# Patient Record
Sex: FEMALE | Race: BLACK OR AFRICAN AMERICAN | NOT HISPANIC OR LATINO
[De-identification: names, ages, dates, MRNs, and addresses within clinical notes are randomized per-mention and may not be internally consistent; named-entity substitution may affect disease eponyms.]

---

## 2017-04-18 ENCOUNTER — RESULT REVIEW (OUTPATIENT)
Age: 32
End: 2017-04-18

## 2017-04-19 ENCOUNTER — OUTPATIENT (OUTPATIENT)
Dept: OUTPATIENT SERVICES | Facility: HOSPITAL | Age: 32
LOS: 1 days | End: 2017-04-19
Payer: MEDICAID

## 2017-04-19 ENCOUNTER — APPOINTMENT (OUTPATIENT)
Dept: OBGYN | Facility: CLINIC | Age: 32
End: 2017-04-19

## 2017-04-19 VITALS
SYSTOLIC BLOOD PRESSURE: 128 MMHG | WEIGHT: 154 LBS | DIASTOLIC BLOOD PRESSURE: 79 MMHG | HEIGHT: 67 IN | BODY MASS INDEX: 24.17 KG/M2

## 2017-04-19 DIAGNOSIS — Z34.00 ENCOUNTER FOR SUPERVISION OF NORMAL FIRST PREGNANCY, UNSPECIFIED TRIMESTER: ICD-10-CM

## 2017-04-19 DIAGNOSIS — Z86.2 PERSONAL HISTORY OF DISEASES OF THE BLOOD AND BLOOD-FORMING ORGANS AND CERTAIN DISORDERS INVOLVING THE IMMUNE MECHANISM: ICD-10-CM

## 2017-04-19 LAB
APPEARANCE UR: CLEAR — SIGNIFICANT CHANGE UP
BASOPHILS # BLD AUTO: 0.02 K/UL — SIGNIFICANT CHANGE UP (ref 0–0.2)
BASOPHILS NFR BLD AUTO: 0.2 % — SIGNIFICANT CHANGE UP (ref 0–2)
BILIRUB UR-MCNC: NEGATIVE — SIGNIFICANT CHANGE UP
COLOR SPEC: YELLOW — SIGNIFICANT CHANGE UP
DIFF PNL FLD: NEGATIVE — SIGNIFICANT CHANGE UP
EOSINOPHIL # BLD AUTO: 0.35 K/UL — SIGNIFICANT CHANGE UP (ref 0–0.5)
EOSINOPHIL NFR BLD AUTO: 3.9 % — SIGNIFICANT CHANGE UP (ref 0–6)
GLUCOSE 1H P GLC SERPL-MCNC: 93 MG/DL — SIGNIFICANT CHANGE UP (ref 70–134)
GLUCOSE UR QL: NEGATIVE MG/DL — SIGNIFICANT CHANGE UP
HBA1C BLD-MCNC: 4.8 % — SIGNIFICANT CHANGE UP (ref 4–5.6)
HBV SURFACE AG SER-ACNC: SIGNIFICANT CHANGE UP
HCT VFR BLD CALC: 32.1 % — LOW (ref 34.5–45)
HGB BLD-MCNC: 10.5 G/DL — LOW (ref 11.5–15.5)
HIV 1+2 AB+HIV1 P24 AG SERPL QL IA: SIGNIFICANT CHANGE UP
IMM GRANULOCYTES NFR BLD AUTO: 0.7 % — SIGNIFICANT CHANGE UP (ref 0–1.5)
KETONES UR-MCNC: NEGATIVE — SIGNIFICANT CHANGE UP
LEUKOCYTE ESTERASE UR-ACNC: NEGATIVE — SIGNIFICANT CHANGE UP
LYMPHOCYTES # BLD AUTO: 1.45 K/UL — SIGNIFICANT CHANGE UP (ref 1–3.3)
LYMPHOCYTES # BLD AUTO: 16.2 % — SIGNIFICANT CHANGE UP (ref 13–44)
MCHC RBC-ENTMCNC: 27.4 PG — SIGNIFICANT CHANGE UP (ref 27–34)
MCHC RBC-ENTMCNC: 32.7 GM/DL — SIGNIFICANT CHANGE UP (ref 32–36)
MCV RBC AUTO: 83.8 FL — SIGNIFICANT CHANGE UP (ref 80–100)
MONOCYTES # BLD AUTO: 0.92 K/UL — HIGH (ref 0–0.9)
MONOCYTES NFR BLD AUTO: 10.3 % — SIGNIFICANT CHANGE UP (ref 2–14)
NEUTROPHILS # BLD AUTO: 6.16 K/UL — SIGNIFICANT CHANGE UP (ref 1.8–7.4)
NEUTROPHILS NFR BLD AUTO: 68.7 % — SIGNIFICANT CHANGE UP (ref 43–77)
NITRITE UR-MCNC: NEGATIVE — SIGNIFICANT CHANGE UP
PH UR: 6.5 — SIGNIFICANT CHANGE UP (ref 5–8)
PLATELET # BLD AUTO: 212 K/UL — SIGNIFICANT CHANGE UP (ref 150–400)
PROT UR-MCNC: NEGATIVE MG/DL — SIGNIFICANT CHANGE UP
RBC # BLD: 3.83 M/UL — SIGNIFICANT CHANGE UP (ref 3.8–5.2)
RBC # FLD: 13 % — SIGNIFICANT CHANGE UP (ref 10.3–14.5)
RUBV IGG SER-ACNC: 0.7 INDEX — SIGNIFICANT CHANGE UP
RUBV IGG SER-IMP: NEGATIVE — SIGNIFICANT CHANGE UP
SP GR SPEC: 1.01 — SIGNIFICANT CHANGE UP (ref 1.01–1.02)
T PALLIDUM AB TITR SER: NEGATIVE — SIGNIFICANT CHANGE UP
T3FREE SERPL-MCNC: 2.73 PG/ML — SIGNIFICANT CHANGE UP (ref 1.8–4.6)
T4 FREE SERPL-MCNC: 1.1 NG/DL — SIGNIFICANT CHANGE UP (ref 0.9–1.8)
TSH SERPL-MCNC: 0.59 UIU/ML — SIGNIFICANT CHANGE UP (ref 0.27–4.2)
UROBILINOGEN FLD QL: NEGATIVE MG/DL — SIGNIFICANT CHANGE UP
VZV IGG FLD QL IA: 1328 INDEX — SIGNIFICANT CHANGE UP
VZV IGG FLD QL IA: POSITIVE — SIGNIFICANT CHANGE UP
WBC # BLD: 8.96 K/UL — SIGNIFICANT CHANGE UP (ref 3.8–10.5)
WBC # FLD AUTO: 8.96 K/UL — SIGNIFICANT CHANGE UP (ref 3.8–10.5)

## 2017-04-19 PROCEDURE — 86780 TREPONEMA PALLIDUM: CPT

## 2017-04-19 PROCEDURE — G0463: CPT

## 2017-04-19 PROCEDURE — 88175 CYTOPATH C/V AUTO FLUID REDO: CPT

## 2017-04-19 PROCEDURE — 86900 BLOOD TYPING SEROLOGIC ABO: CPT

## 2017-04-19 PROCEDURE — 83655 ASSAY OF LEAD: CPT

## 2017-04-19 PROCEDURE — 87340 HEPATITIS B SURFACE AG IA: CPT

## 2017-04-19 PROCEDURE — 87389 HIV-1 AG W/HIV-1&-2 AB AG IA: CPT

## 2017-04-19 PROCEDURE — 36415 COLL VENOUS BLD VENIPUNCTURE: CPT

## 2017-04-19 PROCEDURE — 81003 URINALYSIS AUTO W/O SCOPE: CPT

## 2017-04-19 PROCEDURE — 86762 RUBELLA ANTIBODY: CPT

## 2017-04-19 PROCEDURE — 84439 ASSAY OF FREE THYROXINE: CPT

## 2017-04-19 PROCEDURE — 86787 VARICELLA-ZOSTER ANTIBODY: CPT

## 2017-04-19 PROCEDURE — 84481 FREE ASSAY (FT-3): CPT

## 2017-04-19 PROCEDURE — 85027 COMPLETE CBC AUTOMATED: CPT

## 2017-04-19 PROCEDURE — 86850 RBC ANTIBODY SCREEN: CPT

## 2017-04-19 PROCEDURE — 81025 URINE PREGNANCY TEST: CPT

## 2017-04-19 PROCEDURE — 86480 TB TEST CELL IMMUN MEASURE: CPT

## 2017-04-19 PROCEDURE — 83020 HEMOGLOBIN ELECTROPHORESIS: CPT

## 2017-04-19 PROCEDURE — 83020 HEMOGLOBIN ELECTROPHORESIS: CPT | Mod: 26

## 2017-04-19 PROCEDURE — 86901 BLOOD TYPING SEROLOGIC RH(D): CPT

## 2017-04-19 PROCEDURE — 83036 HEMOGLOBIN GLYCOSYLATED A1C: CPT

## 2017-04-19 PROCEDURE — 87086 URINE CULTURE/COLONY COUNT: CPT

## 2017-04-19 PROCEDURE — 84443 ASSAY THYROID STIM HORMONE: CPT

## 2017-04-19 PROCEDURE — 82950 GLUCOSE TEST: CPT

## 2017-04-20 LAB
C TRACH RRNA SPEC QL NAA+PROBE: SIGNIFICANT CHANGE UP
C TRACH+GC RRNA SPEC QL PROBE: SIGNIFICANT CHANGE UP
CULTURE RESULTS: SIGNIFICANT CHANGE UP
HGB S BLD QL: POSITIVE
LEAD BLD-MCNC: 2 UG/DL — SIGNIFICANT CHANGE UP (ref 0–19)
N GONORRHOEA RRNA SPEC QL NAA+PROBE: SIGNIFICANT CHANGE UP
SOLUBILITY: POSITIVE
SPECIMEN SOURCE: SIGNIFICANT CHANGE UP

## 2017-04-21 ENCOUNTER — APPOINTMENT (OUTPATIENT)
Dept: OBGYN | Facility: CLINIC | Age: 32
End: 2017-04-21

## 2017-04-21 DIAGNOSIS — D57.3 SICKLE-CELL TRAIT: ICD-10-CM

## 2017-04-21 DIAGNOSIS — Z34.03 ENCOUNTER FOR SUPERVISION OF NORMAL FIRST PREGNANCY, THIRD TRIMESTER: ICD-10-CM

## 2017-04-21 DIAGNOSIS — E01.0 IODINE-DEFICIENCY RELATED DIFFUSE (ENDEMIC) GOITER: ICD-10-CM

## 2017-04-21 LAB
CYTOLOGY SPEC DOC CYTO: SIGNIFICANT CHANGE UP
HEMOGLOBIN INTERPRETATION: SIGNIFICANT CHANGE UP
HGB A MFR BLD: 61.5 % — LOW (ref 95.8–98)
HGB A2 MFR BLD: 3.1 % — SIGNIFICANT CHANGE UP (ref 2–3.2)
HGB S MFR BLD: 35.4 % — HIGH

## 2017-04-22 LAB
M TB TUBERC IFN-G BLD QL: 0 IU/ML — SIGNIFICANT CHANGE UP
M TB TUBERC IFN-G BLD QL: 0.02 IU/ML — SIGNIFICANT CHANGE UP
M TB TUBERC IFN-G BLD QL: NEGATIVE — SIGNIFICANT CHANGE UP
MITOGEN IGNF BCKGRD COR BLD-ACNC: 0.98 IU/ML — SIGNIFICANT CHANGE UP

## 2017-05-01 ENCOUNTER — ASOB RESULT (OUTPATIENT)
Age: 32
End: 2017-05-01

## 2017-05-01 ENCOUNTER — APPOINTMENT (OUTPATIENT)
Dept: ANTEPARTUM | Facility: CLINIC | Age: 32
End: 2017-05-01

## 2017-05-03 ENCOUNTER — APPOINTMENT (OUTPATIENT)
Dept: OBGYN | Facility: CLINIC | Age: 32
End: 2017-05-03

## 2017-05-03 ENCOUNTER — OUTPATIENT (OUTPATIENT)
Dept: OUTPATIENT SERVICES | Facility: HOSPITAL | Age: 32
LOS: 1 days | End: 2017-05-03
Payer: MEDICAID

## 2017-05-03 VITALS
HEIGHT: 67 IN | SYSTOLIC BLOOD PRESSURE: 123 MMHG | WEIGHT: 159 LBS | DIASTOLIC BLOOD PRESSURE: 84 MMHG | BODY MASS INDEX: 24.96 KG/M2

## 2017-05-03 DIAGNOSIS — E01.0 IODINE-DEFICIENCY RELATED DIFFUSE (ENDEMIC) GOITER: ICD-10-CM

## 2017-05-03 DIAGNOSIS — Z3A.33 33 WEEKS GESTATION OF PREGNANCY: ICD-10-CM

## 2017-05-03 DIAGNOSIS — Z34.00 ENCOUNTER FOR SUPERVISION OF NORMAL FIRST PREGNANCY, UNSPECIFIED TRIMESTER: ICD-10-CM

## 2017-05-03 PROCEDURE — 81003 URINALYSIS AUTO W/O SCOPE: CPT

## 2017-05-03 PROCEDURE — 87653 STREP B DNA AMP PROBE: CPT

## 2017-05-03 PROCEDURE — G0463: CPT

## 2017-05-04 DIAGNOSIS — Z36 ENCOUNTER FOR ANTENATAL SCREENING OF MOTHER: ICD-10-CM

## 2017-05-04 DIAGNOSIS — Z34.03 ENCOUNTER FOR SUPERVISION OF NORMAL FIRST PREGNANCY, THIRD TRIMESTER: ICD-10-CM

## 2017-05-04 DIAGNOSIS — D57.3 SICKLE-CELL TRAIT: ICD-10-CM

## 2017-05-04 LAB
C TRACH RRNA SPEC QL NAA+PROBE: SIGNIFICANT CHANGE UP
N GONORRHOEA RRNA SPEC QL NAA+PROBE: SIGNIFICANT CHANGE UP
SPECIMEN SOURCE: SIGNIFICANT CHANGE UP

## 2017-05-05 LAB
GROUP B BETA STREP DNA (PCR): SIGNIFICANT CHANGE UP
GROUP B BETA STREP INTERPRETATION: SIGNIFICANT CHANGE UP
SOURCE GROUP B STREP: SIGNIFICANT CHANGE UP

## 2017-05-10 ENCOUNTER — OUTPATIENT (OUTPATIENT)
Dept: OUTPATIENT SERVICES | Facility: HOSPITAL | Age: 32
LOS: 1 days | End: 2017-05-10
Payer: MEDICAID

## 2017-05-10 ENCOUNTER — APPOINTMENT (OUTPATIENT)
Dept: OBGYN | Facility: CLINIC | Age: 32
End: 2017-05-10

## 2017-05-10 VITALS
SYSTOLIC BLOOD PRESSURE: 122 MMHG | DIASTOLIC BLOOD PRESSURE: 80 MMHG | BODY MASS INDEX: 25.32 KG/M2 | WEIGHT: 161.31 LBS | HEIGHT: 67 IN

## 2017-05-10 DIAGNOSIS — Z36 ENCOUNTER FOR ANTENATAL SCREENING OF MOTHER: ICD-10-CM

## 2017-05-10 DIAGNOSIS — Z34.00 ENCOUNTER FOR SUPERVISION OF NORMAL FIRST PREGNANCY, UNSPECIFIED TRIMESTER: ICD-10-CM

## 2017-05-10 PROCEDURE — G0463: CPT

## 2017-05-10 PROCEDURE — 81003 URINALYSIS AUTO W/O SCOPE: CPT

## 2017-05-11 DIAGNOSIS — Z34.03 ENCOUNTER FOR SUPERVISION OF NORMAL FIRST PREGNANCY, THIRD TRIMESTER: ICD-10-CM

## 2017-05-11 DIAGNOSIS — D57.3 SICKLE-CELL TRAIT: ICD-10-CM

## 2017-05-19 ENCOUNTER — APPOINTMENT (OUTPATIENT)
Dept: OBGYN | Facility: CLINIC | Age: 32
End: 2017-05-19

## 2017-05-19 ENCOUNTER — OUTPATIENT (OUTPATIENT)
Dept: OUTPATIENT SERVICES | Facility: HOSPITAL | Age: 32
LOS: 1 days | End: 2017-05-19
Payer: MEDICAID

## 2017-05-19 VITALS
BODY MASS INDEX: 25.74 KG/M2 | WEIGHT: 164 LBS | DIASTOLIC BLOOD PRESSURE: 68 MMHG | SYSTOLIC BLOOD PRESSURE: 119 MMHG | HEIGHT: 67 IN

## 2017-05-19 DIAGNOSIS — Z34.00 ENCOUNTER FOR SUPERVISION OF NORMAL FIRST PREGNANCY, UNSPECIFIED TRIMESTER: ICD-10-CM

## 2017-05-19 PROCEDURE — 81003 URINALYSIS AUTO W/O SCOPE: CPT

## 2017-05-19 PROCEDURE — G0463: CPT

## 2017-05-23 DIAGNOSIS — Z34.03 ENCOUNTER FOR SUPERVISION OF NORMAL FIRST PREGNANCY, THIRD TRIMESTER: ICD-10-CM

## 2017-05-26 ENCOUNTER — OUTPATIENT (OUTPATIENT)
Dept: OUTPATIENT SERVICES | Facility: HOSPITAL | Age: 32
LOS: 1 days | End: 2017-05-26
Payer: MEDICAID

## 2017-05-26 ENCOUNTER — APPOINTMENT (OUTPATIENT)
Dept: OBGYN | Facility: CLINIC | Age: 32
End: 2017-05-26

## 2017-05-26 VITALS
SYSTOLIC BLOOD PRESSURE: 117 MMHG | BODY MASS INDEX: 26.06 KG/M2 | DIASTOLIC BLOOD PRESSURE: 72 MMHG | HEIGHT: 67 IN | WEIGHT: 166 LBS

## 2017-05-26 DIAGNOSIS — Z34.00 ENCOUNTER FOR SUPERVISION OF NORMAL FIRST PREGNANCY, UNSPECIFIED TRIMESTER: ICD-10-CM

## 2017-05-26 PROCEDURE — G0463: CPT

## 2017-06-02 ENCOUNTER — APPOINTMENT (OUTPATIENT)
Dept: OBGYN | Facility: CLINIC | Age: 32
End: 2017-06-02

## 2017-06-02 ENCOUNTER — OUTPATIENT (OUTPATIENT)
Dept: OUTPATIENT SERVICES | Facility: HOSPITAL | Age: 32
LOS: 1 days | End: 2017-06-02
Payer: MEDICAID

## 2017-06-02 VITALS
HEIGHT: 67 IN | WEIGHT: 167 LBS | DIASTOLIC BLOOD PRESSURE: 79 MMHG | SYSTOLIC BLOOD PRESSURE: 139 MMHG | BODY MASS INDEX: 26.21 KG/M2

## 2017-06-02 DIAGNOSIS — Z34.03 ENCOUNTER FOR SUPERVISION OF NORMAL FIRST PREGNANCY, THIRD TRIMESTER: ICD-10-CM

## 2017-06-02 DIAGNOSIS — Z34.00 ENCOUNTER FOR SUPERVISION OF NORMAL FIRST PREGNANCY, UNSPECIFIED TRIMESTER: ICD-10-CM

## 2017-06-02 LAB
APPEARANCE UR: CLEAR — SIGNIFICANT CHANGE UP
BILIRUB UR-MCNC: NEGATIVE — SIGNIFICANT CHANGE UP
COLOR SPEC: YELLOW — SIGNIFICANT CHANGE UP
DIFF PNL FLD: NEGATIVE — SIGNIFICANT CHANGE UP
GLUCOSE UR QL: NEGATIVE MG/DL — SIGNIFICANT CHANGE UP
KETONES UR-MCNC: NEGATIVE — SIGNIFICANT CHANGE UP
LEUKOCYTE ESTERASE UR-ACNC: NEGATIVE — SIGNIFICANT CHANGE UP
NITRITE UR-MCNC: NEGATIVE — SIGNIFICANT CHANGE UP
PH UR: 6.5 — SIGNIFICANT CHANGE UP (ref 5–8)
PROT UR-MCNC: NEGATIVE MG/DL — SIGNIFICANT CHANGE UP
SP GR SPEC: 1.01 — SIGNIFICANT CHANGE UP (ref 1.01–1.02)
UROBILINOGEN FLD QL: NEGATIVE MG/DL — SIGNIFICANT CHANGE UP

## 2017-06-02 PROCEDURE — 81003 URINALYSIS AUTO W/O SCOPE: CPT

## 2017-06-02 PROCEDURE — 87086 URINE CULTURE/COLONY COUNT: CPT

## 2017-06-02 PROCEDURE — G0463: CPT

## 2017-06-03 LAB
CULTURE RESULTS: SIGNIFICANT CHANGE UP
SPECIMEN SOURCE: SIGNIFICANT CHANGE UP

## 2017-06-04 ENCOUNTER — INPATIENT (INPATIENT)
Facility: HOSPITAL | Age: 32
LOS: 2 days | Discharge: HOME | End: 2017-06-07
Attending: OBSTETRICS & GYNECOLOGY | Admitting: OBSTETRICS & GYNECOLOGY
Payer: MEDICAID

## 2017-06-04 VITALS — HEIGHT: 67 IN | WEIGHT: 165.35 LBS

## 2017-06-04 DIAGNOSIS — Z3A.00 WEEKS OF GESTATION OF PREGNANCY NOT SPECIFIED: ICD-10-CM

## 2017-06-04 DIAGNOSIS — O26.899 OTHER SPECIFIED PREGNANCY RELATED CONDITIONS, UNSPECIFIED TRIMESTER: ICD-10-CM

## 2017-06-04 LAB
ALBUMIN SERPL ELPH-MCNC: 3.7 G/DL — SIGNIFICANT CHANGE UP (ref 3.3–5)
ALP SERPL-CCNC: 173 U/L — HIGH (ref 40–120)
ALT FLD-CCNC: 10 U/L — SIGNIFICANT CHANGE UP (ref 4–33)
APPEARANCE UR: CLEAR — SIGNIFICANT CHANGE UP
APTT BLD: 32.1 SEC — SIGNIFICANT CHANGE UP (ref 27.5–37.4)
AST SERPL-CCNC: 18 U/L — SIGNIFICANT CHANGE UP (ref 4–32)
BACTERIA # UR AUTO: SIGNIFICANT CHANGE UP
BASOPHILS # BLD AUTO: 0.02 K/UL — SIGNIFICANT CHANGE UP (ref 0–0.2)
BASOPHILS NFR BLD AUTO: 0.2 % — SIGNIFICANT CHANGE UP (ref 0–2)
BILIRUB SERPL-MCNC: 0.3 MG/DL — SIGNIFICANT CHANGE UP (ref 0.2–1.2)
BILIRUB UR-MCNC: NEGATIVE — SIGNIFICANT CHANGE UP
BLD GP AB SCN SERPL QL: NEGATIVE — SIGNIFICANT CHANGE UP
BLD GP AB SCN SERPL QL: NEGATIVE — SIGNIFICANT CHANGE UP
BLOOD UR QL VISUAL: NEGATIVE — SIGNIFICANT CHANGE UP
BUN SERPL-MCNC: 5 MG/DL — LOW (ref 7–23)
CALCIUM SERPL-MCNC: 9.4 MG/DL — SIGNIFICANT CHANGE UP (ref 8.4–10.5)
CHLORIDE SERPL-SCNC: 104 MMOL/L — SIGNIFICANT CHANGE UP (ref 98–107)
CO2 SERPL-SCNC: 21 MMOL/L — LOW (ref 22–31)
COLOR SPEC: SIGNIFICANT CHANGE UP
CREAT ?TM UR-MCNC: 42.93 MG/DL — SIGNIFICANT CHANGE UP
CREAT SERPL-MCNC: 0.74 MG/DL — SIGNIFICANT CHANGE UP (ref 0.5–1.3)
EOSINOPHIL # BLD AUTO: 0.19 K/UL — SIGNIFICANT CHANGE UP (ref 0–0.5)
EOSINOPHIL NFR BLD AUTO: 2.3 % — SIGNIFICANT CHANGE UP (ref 0–6)
FIBRINOGEN PPP-MCNC: 637 MG/DL — HIGH (ref 310–510)
GLUCOSE SERPL-MCNC: 73 MG/DL — SIGNIFICANT CHANGE UP (ref 70–99)
GLUCOSE UR-MCNC: NEGATIVE — SIGNIFICANT CHANGE UP
HCT VFR BLD CALC: 34.2 % — LOW (ref 34.5–45)
HGB BLD-MCNC: 10.9 G/DL — LOW (ref 11.5–15.5)
IMM GRANULOCYTES NFR BLD AUTO: 0.8 % — SIGNIFICANT CHANGE UP (ref 0–1.5)
INR BLD: 0.91 — SIGNIFICANT CHANGE UP (ref 0.88–1.17)
KETONES UR-MCNC: SIGNIFICANT CHANGE UP
LDH SERPL L TO P-CCNC: 194 U/L — SIGNIFICANT CHANGE UP (ref 135–225)
LEUKOCYTE ESTERASE UR-ACNC: NEGATIVE — SIGNIFICANT CHANGE UP
LYMPHOCYTES # BLD AUTO: 1.49 K/UL — SIGNIFICANT CHANGE UP (ref 1–3.3)
LYMPHOCYTES # BLD AUTO: 18 % — SIGNIFICANT CHANGE UP (ref 13–44)
MCHC RBC-ENTMCNC: 26.7 PG — LOW (ref 27–34)
MCHC RBC-ENTMCNC: 31.9 % — LOW (ref 32–36)
MCV RBC AUTO: 83.6 FL — SIGNIFICANT CHANGE UP (ref 80–100)
MONOCYTES # BLD AUTO: 0.86 K/UL — SIGNIFICANT CHANGE UP (ref 0–0.9)
MONOCYTES NFR BLD AUTO: 10.4 % — SIGNIFICANT CHANGE UP (ref 2–14)
NEUTROPHILS # BLD AUTO: 5.64 K/UL — SIGNIFICANT CHANGE UP (ref 1.8–7.4)
NEUTROPHILS NFR BLD AUTO: 68.3 % — SIGNIFICANT CHANGE UP (ref 43–77)
NITRITE UR-MCNC: NEGATIVE — SIGNIFICANT CHANGE UP
PH UR: 6.5 — SIGNIFICANT CHANGE UP (ref 4.6–8)
PLATELET # BLD AUTO: 211 K/UL — SIGNIFICANT CHANGE UP (ref 150–400)
PMV BLD: 11.1 FL — SIGNIFICANT CHANGE UP (ref 7–13)
POTASSIUM SERPL-MCNC: 4.2 MMOL/L — SIGNIFICANT CHANGE UP (ref 3.5–5.3)
POTASSIUM SERPL-SCNC: 4.2 MMOL/L — SIGNIFICANT CHANGE UP (ref 3.5–5.3)
PROT SERPL-MCNC: 7.1 G/DL — SIGNIFICANT CHANGE UP (ref 6–8.3)
PROT UR-MCNC: 6.6 MG/DL — SIGNIFICANT CHANGE UP
PROT UR-MCNC: NEGATIVE — SIGNIFICANT CHANGE UP
PROTHROM AB SERPL-ACNC: 10.2 SEC — SIGNIFICANT CHANGE UP (ref 9.8–13.1)
RBC # BLD: 4.09 M/UL — SIGNIFICANT CHANGE UP (ref 3.8–5.2)
RBC # FLD: 14.2 % — SIGNIFICANT CHANGE UP (ref 10.3–14.5)
RBC CASTS # UR COMP ASSIST: SIGNIFICANT CHANGE UP (ref 0–?)
RH IG SCN BLD-IMP: POSITIVE — SIGNIFICANT CHANGE UP
RH IG SCN BLD-IMP: POSITIVE — SIGNIFICANT CHANGE UP
SODIUM SERPL-SCNC: 139 MMOL/L — SIGNIFICANT CHANGE UP (ref 135–145)
SP GR SPEC: 1.01 — SIGNIFICANT CHANGE UP (ref 1–1.03)
SQUAMOUS # UR AUTO: SIGNIFICANT CHANGE UP
URATE SERPL-MCNC: 3.4 MG/DL — SIGNIFICANT CHANGE UP (ref 2.5–7)
UROBILINOGEN FLD QL: NORMAL E.U. — SIGNIFICANT CHANGE UP (ref 0.1–0.2)
WBC # BLD: 8.27 K/UL — SIGNIFICANT CHANGE UP (ref 3.8–10.5)
WBC # FLD AUTO: 8.27 K/UL — SIGNIFICANT CHANGE UP (ref 3.8–10.5)
WBC UR QL: SIGNIFICANT CHANGE UP (ref 0–?)

## 2017-06-04 RX ORDER — SODIUM CHLORIDE 9 MG/ML
1000 INJECTION, SOLUTION INTRAVENOUS
Qty: 0 | Refills: 0 | Status: DISCONTINUED | OUTPATIENT
Start: 2017-06-04 | End: 2017-06-05

## 2017-06-04 RX ORDER — SODIUM CHLORIDE 9 MG/ML
1000 INJECTION, SOLUTION INTRAVENOUS ONCE
Qty: 0 | Refills: 0 | Status: DISCONTINUED | OUTPATIENT
Start: 2017-06-04 | End: 2017-06-05

## 2017-06-04 RX ORDER — OXYTOCIN 10 UNIT/ML
333.33 VIAL (ML) INJECTION
Qty: 20 | Refills: 0 | Status: DISCONTINUED | OUTPATIENT
Start: 2017-06-04 | End: 2017-06-05

## 2017-06-04 RX ADMIN — SODIUM CHLORIDE 125 MILLILITER(S): 9 INJECTION, SOLUTION INTRAVENOUS at 21:19

## 2017-06-05 ENCOUNTER — APPOINTMENT (OUTPATIENT)
Dept: OBGYN | Facility: CLINIC | Age: 32
End: 2017-06-05

## 2017-06-05 DIAGNOSIS — Z34.03 ENCOUNTER FOR SUPERVISION OF NORMAL FIRST PREGNANCY, THIRD TRIMESTER: ICD-10-CM

## 2017-06-05 LAB
ALBUMIN SERPL ELPH-MCNC: 3.5 G/DL — SIGNIFICANT CHANGE UP (ref 3.3–5)
ALP SERPL-CCNC: 170 U/L — HIGH (ref 40–120)
ALT FLD-CCNC: 8 U/L — SIGNIFICANT CHANGE UP (ref 4–33)
APTT BLD: 29.2 SEC — SIGNIFICANT CHANGE UP (ref 27.5–37.4)
AST SERPL-CCNC: 18 U/L — SIGNIFICANT CHANGE UP (ref 4–32)
BASOPHILS # BLD AUTO: 0.02 K/UL — SIGNIFICANT CHANGE UP (ref 0–0.2)
BASOPHILS NFR BLD AUTO: 0.2 % — SIGNIFICANT CHANGE UP (ref 0–2)
BILIRUB SERPL-MCNC: 0.4 MG/DL — SIGNIFICANT CHANGE UP (ref 0.2–1.2)
BUN SERPL-MCNC: 5 MG/DL — LOW (ref 7–23)
CALCIUM SERPL-MCNC: 9.4 MG/DL — SIGNIFICANT CHANGE UP (ref 8.4–10.5)
CHLORIDE SERPL-SCNC: 104 MMOL/L — SIGNIFICANT CHANGE UP (ref 98–107)
CO2 SERPL-SCNC: 28 MMOL/L — SIGNIFICANT CHANGE UP (ref 22–31)
CREAT SERPL-MCNC: 0.66 MG/DL — SIGNIFICANT CHANGE UP (ref 0.5–1.3)
EOSINOPHIL # BLD AUTO: 0.13 K/UL — SIGNIFICANT CHANGE UP (ref 0–0.5)
EOSINOPHIL NFR BLD AUTO: 1.4 % — SIGNIFICANT CHANGE UP (ref 0–6)
FIBRINOGEN PPP-MCNC: 723 MG/DL — HIGH (ref 310–510)
GLUCOSE SERPL-MCNC: 73 MG/DL — SIGNIFICANT CHANGE UP (ref 70–99)
HCT VFR BLD CALC: 34.1 % — LOW (ref 34.5–45)
HGB BLD-MCNC: 11 G/DL — LOW (ref 11.5–15.5)
IMM GRANULOCYTES NFR BLD AUTO: 0.5 % — SIGNIFICANT CHANGE UP (ref 0–1.5)
INR BLD: 0.97 — SIGNIFICANT CHANGE UP (ref 0.88–1.17)
LDH SERPL L TO P-CCNC: 204 U/L — SIGNIFICANT CHANGE UP (ref 135–225)
LYMPHOCYTES # BLD AUTO: 1.81 K/UL — SIGNIFICANT CHANGE UP (ref 1–3.3)
LYMPHOCYTES # BLD AUTO: 18.9 % — SIGNIFICANT CHANGE UP (ref 13–44)
MCHC RBC-ENTMCNC: 26.9 PG — LOW (ref 27–34)
MCHC RBC-ENTMCNC: 32.3 % — SIGNIFICANT CHANGE UP (ref 32–36)
MCV RBC AUTO: 83.4 FL — SIGNIFICANT CHANGE UP (ref 80–100)
MONOCYTES # BLD AUTO: 0.79 K/UL — SIGNIFICANT CHANGE UP (ref 0–0.9)
MONOCYTES NFR BLD AUTO: 8.2 % — SIGNIFICANT CHANGE UP (ref 2–14)
NEUTROPHILS # BLD AUTO: 6.78 K/UL — SIGNIFICANT CHANGE UP (ref 1.8–7.4)
NEUTROPHILS NFR BLD AUTO: 70.8 % — SIGNIFICANT CHANGE UP (ref 43–77)
PLATELET # BLD AUTO: 206 K/UL — SIGNIFICANT CHANGE UP (ref 150–400)
PMV BLD: 11.7 FL — SIGNIFICANT CHANGE UP (ref 7–13)
POTASSIUM SERPL-MCNC: 3.7 MMOL/L — SIGNIFICANT CHANGE UP (ref 3.5–5.3)
POTASSIUM SERPL-SCNC: 3.7 MMOL/L — SIGNIFICANT CHANGE UP (ref 3.5–5.3)
PROT SERPL-MCNC: 6.8 G/DL — SIGNIFICANT CHANGE UP (ref 6–8.3)
PROTHROM AB SERPL-ACNC: 10.9 SEC — SIGNIFICANT CHANGE UP (ref 9.8–13.1)
RBC # BLD: 4.09 M/UL — SIGNIFICANT CHANGE UP (ref 3.8–5.2)
RBC # FLD: 14.2 % — SIGNIFICANT CHANGE UP (ref 10.3–14.5)
SODIUM SERPL-SCNC: 140 MMOL/L — SIGNIFICANT CHANGE UP (ref 135–145)
URATE SERPL-MCNC: 3.8 MG/DL — SIGNIFICANT CHANGE UP (ref 2.5–7)
WBC # BLD: 9.58 K/UL — SIGNIFICANT CHANGE UP (ref 3.8–10.5)
WBC # FLD AUTO: 9.58 K/UL — SIGNIFICANT CHANGE UP (ref 3.8–10.5)

## 2017-06-05 PROCEDURE — 59409 OBSTETRICAL CARE: CPT | Mod: U9

## 2017-06-05 RX ORDER — DIBUCAINE 1 %
1 OINTMENT (GRAM) RECTAL EVERY 4 HOURS
Qty: 0 | Refills: 0 | Status: DISCONTINUED | OUTPATIENT
Start: 2017-06-05 | End: 2017-06-05

## 2017-06-05 RX ORDER — CITRIC ACID/SODIUM CITRATE 300-500 MG
15 SOLUTION, ORAL ORAL EVERY 4 HOURS
Qty: 0 | Refills: 0 | Status: DISCONTINUED | OUTPATIENT
Start: 2017-06-05 | End: 2017-06-05

## 2017-06-05 RX ORDER — SODIUM CHLORIDE 9 MG/ML
3 INJECTION INTRAMUSCULAR; INTRAVENOUS; SUBCUTANEOUS EVERY 8 HOURS
Qty: 0 | Refills: 0 | Status: DISCONTINUED | OUTPATIENT
Start: 2017-06-05 | End: 2017-06-05

## 2017-06-05 RX ORDER — ACETAMINOPHEN 500 MG
975 TABLET ORAL EVERY 6 HOURS
Qty: 0 | Refills: 0 | Status: DISCONTINUED | OUTPATIENT
Start: 2017-06-05 | End: 2017-06-07

## 2017-06-05 RX ORDER — DIPHENHYDRAMINE HCL 50 MG
25 CAPSULE ORAL EVERY 6 HOURS
Qty: 0 | Refills: 0 | Status: DISCONTINUED | OUTPATIENT
Start: 2017-06-05 | End: 2017-06-07

## 2017-06-05 RX ORDER — AER TRAVELER 0.5 G/1
1 SOLUTION RECTAL; TOPICAL EVERY 4 HOURS
Qty: 0 | Refills: 0 | Status: DISCONTINUED | OUTPATIENT
Start: 2017-06-05 | End: 2017-06-05

## 2017-06-05 RX ORDER — OXYCODONE HYDROCHLORIDE 5 MG/1
5 TABLET ORAL
Qty: 0 | Refills: 0 | Status: DISCONTINUED | OUTPATIENT
Start: 2017-06-05 | End: 2017-06-07

## 2017-06-05 RX ORDER — LANOLIN
1 OINTMENT (GRAM) TOPICAL EVERY 6 HOURS
Qty: 0 | Refills: 0 | Status: DISCONTINUED | OUTPATIENT
Start: 2017-06-05 | End: 2017-06-07

## 2017-06-05 RX ORDER — OXYCODONE HYDROCHLORIDE 5 MG/1
5 TABLET ORAL EVERY 4 HOURS
Qty: 0 | Refills: 0 | Status: DISCONTINUED | OUTPATIENT
Start: 2017-06-05 | End: 2017-06-07

## 2017-06-05 RX ORDER — OXYTOCIN 10 UNIT/ML
2 VIAL (ML) INJECTION
Qty: 30 | Refills: 0 | Status: DISCONTINUED | OUTPATIENT
Start: 2017-06-05 | End: 2017-06-05

## 2017-06-05 RX ORDER — KETOROLAC TROMETHAMINE 30 MG/ML
30 SYRINGE (ML) INJECTION ONCE
Qty: 0 | Refills: 0 | Status: DISCONTINUED | OUTPATIENT
Start: 2017-06-05 | End: 2017-06-05

## 2017-06-05 RX ORDER — PRAMOXINE HYDROCHLORIDE 150 MG/15G
1 AEROSOL, FOAM RECTAL EVERY 4 HOURS
Qty: 0 | Refills: 0 | Status: DISCONTINUED | OUTPATIENT
Start: 2017-06-05 | End: 2017-06-05

## 2017-06-05 RX ORDER — OXYTOCIN 10 UNIT/ML
333.33 VIAL (ML) INJECTION
Qty: 20 | Refills: 0 | Status: COMPLETED | OUTPATIENT
Start: 2017-06-05

## 2017-06-05 RX ORDER — SODIUM CHLORIDE 9 MG/ML
1000 INJECTION, SOLUTION INTRAVENOUS
Qty: 0 | Refills: 0 | Status: DISCONTINUED | OUTPATIENT
Start: 2017-06-05 | End: 2017-06-05

## 2017-06-05 RX ORDER — SODIUM CHLORIDE 9 MG/ML
1000 INJECTION, SOLUTION INTRAVENOUS ONCE
Qty: 0 | Refills: 0 | Status: DISCONTINUED | OUTPATIENT
Start: 2017-06-05 | End: 2017-06-05

## 2017-06-05 RX ORDER — AER TRAVELER 0.5 G/1
1 SOLUTION RECTAL; TOPICAL EVERY 4 HOURS
Qty: 0 | Refills: 0 | Status: DISCONTINUED | OUTPATIENT
Start: 2017-06-05 | End: 2017-06-07

## 2017-06-05 RX ORDER — PRAMOXINE HYDROCHLORIDE 150 MG/15G
1 AEROSOL, FOAM RECTAL EVERY 4 HOURS
Qty: 0 | Refills: 0 | Status: DISCONTINUED | OUTPATIENT
Start: 2017-06-05 | End: 2017-06-07

## 2017-06-05 RX ORDER — HYDROCORTISONE 1 %
1 OINTMENT (GRAM) TOPICAL EVERY 4 HOURS
Qty: 0 | Refills: 0 | Status: DISCONTINUED | OUTPATIENT
Start: 2017-06-05 | End: 2017-06-05

## 2017-06-05 RX ORDER — IBUPROFEN 200 MG
600 TABLET ORAL EVERY 6 HOURS
Qty: 0 | Refills: 0 | Status: COMPLETED | OUTPATIENT
Start: 2017-06-05 | End: 2018-05-04

## 2017-06-05 RX ORDER — OXYTOCIN 10 UNIT/ML
333.33 VIAL (ML) INJECTION
Qty: 20 | Refills: 0 | Status: COMPLETED | OUTPATIENT
Start: 2017-06-05 | End: 2017-06-05

## 2017-06-05 RX ORDER — HYDROCORTISONE 1 %
1 OINTMENT (GRAM) TOPICAL EVERY 4 HOURS
Qty: 0 | Refills: 0 | Status: DISCONTINUED | OUTPATIENT
Start: 2017-06-05 | End: 2017-06-07

## 2017-06-05 RX ORDER — MAGNESIUM HYDROXIDE 400 MG/1
30 TABLET, CHEWABLE ORAL
Qty: 0 | Refills: 0 | Status: DISCONTINUED | OUTPATIENT
Start: 2017-06-05 | End: 2017-06-07

## 2017-06-05 RX ORDER — OXYTOCIN 10 UNIT/ML
41.67 VIAL (ML) INJECTION
Qty: 20 | Refills: 0 | Status: DISCONTINUED | OUTPATIENT
Start: 2017-06-05 | End: 2017-06-05

## 2017-06-05 RX ORDER — GLYCERIN ADULT
1 SUPPOSITORY, RECTAL RECTAL AT BEDTIME
Qty: 0 | Refills: 0 | Status: DISCONTINUED | OUTPATIENT
Start: 2017-06-05 | End: 2017-06-07

## 2017-06-05 RX ORDER — SODIUM CHLORIDE 9 MG/ML
3 INJECTION INTRAMUSCULAR; INTRAVENOUS; SUBCUTANEOUS EVERY 8 HOURS
Qty: 0 | Refills: 0 | Status: DISCONTINUED | OUTPATIENT
Start: 2017-06-05 | End: 2017-06-06

## 2017-06-05 RX ORDER — IBUPROFEN 200 MG
600 TABLET ORAL EVERY 6 HOURS
Qty: 0 | Refills: 0 | Status: DISCONTINUED | OUTPATIENT
Start: 2017-06-05 | End: 2017-06-07

## 2017-06-05 RX ORDER — ACETAMINOPHEN 500 MG
975 TABLET ORAL EVERY 6 HOURS
Qty: 0 | Refills: 0 | Status: COMPLETED | OUTPATIENT
Start: 2017-06-05 | End: 2018-05-04

## 2017-06-05 RX ORDER — SIMETHICONE 80 MG/1
80 TABLET, CHEWABLE ORAL EVERY 6 HOURS
Qty: 0 | Refills: 0 | Status: DISCONTINUED | OUTPATIENT
Start: 2017-06-05 | End: 2017-06-07

## 2017-06-05 RX ORDER — DIBUCAINE 1 %
1 OINTMENT (GRAM) RECTAL EVERY 4 HOURS
Qty: 0 | Refills: 0 | Status: DISCONTINUED | OUTPATIENT
Start: 2017-06-05 | End: 2017-06-07

## 2017-06-05 RX ORDER — TETANUS TOXOID, REDUCED DIPHTHERIA TOXOID AND ACELLULAR PERTUSSIS VACCINE, ADSORBED 5; 2.5; 8; 8; 2.5 [IU]/.5ML; [IU]/.5ML; UG/.5ML; UG/.5ML; UG/.5ML
0.5 SUSPENSION INTRAMUSCULAR ONCE
Qty: 0 | Refills: 0 | Status: DISCONTINUED | OUTPATIENT
Start: 2017-06-05 | End: 2017-06-07

## 2017-06-05 RX ORDER — DOCUSATE SODIUM 100 MG
100 CAPSULE ORAL
Qty: 0 | Refills: 0 | Status: DISCONTINUED | OUTPATIENT
Start: 2017-06-05 | End: 2017-06-07

## 2017-06-05 RX ADMIN — Medication 30 MILLIGRAM(S): at 10:50

## 2017-06-05 RX ADMIN — Medication 975 MILLIGRAM(S): at 16:07

## 2017-06-05 RX ADMIN — Medication 600 MILLIGRAM(S): at 18:05

## 2017-06-05 RX ADMIN — Medication 975 MILLIGRAM(S): at 17:00

## 2017-06-05 RX ADMIN — SODIUM CHLORIDE 3 MILLILITER(S): 9 INJECTION INTRAMUSCULAR; INTRAVENOUS; SUBCUTANEOUS at 22:00

## 2017-06-05 RX ADMIN — Medication 125 MILLIUNIT(S)/MIN: at 11:13

## 2017-06-05 RX ADMIN — Medication 600 MILLIGRAM(S): at 23:55

## 2017-06-05 RX ADMIN — SODIUM CHLORIDE 125 MILLILITER(S): 9 INJECTION, SOLUTION INTRAVENOUS at 06:37

## 2017-06-05 RX ADMIN — Medication 2 MILLIUNIT(S)/MIN: at 06:36

## 2017-06-05 RX ADMIN — Medication 30 MILLIGRAM(S): at 11:20

## 2017-06-05 RX ADMIN — Medication 600 MILLIGRAM(S): at 19:00

## 2017-06-05 RX ADMIN — Medication 1000 MILLIUNIT(S)/MIN: at 11:13

## 2017-06-05 NOTE — PROVIDER CONTACT NOTE (OTHER) - ASSESSMENT
Pt denies headache,dizziness,blurred vision.or spots before her eyes.Lochia not excessive. Pt denies headache,dizziness,blurred vision.or spots before her eyes.Lochia not excessive.Voiding in bathroom.

## 2017-06-05 NOTE — PROVIDER CONTACT NOTE (OTHER) - ACTION/TREATMENT ORDERED:
HERIBERTO Hart was made aware of vital signs,as per N.[place] will recheck  vital signs at 4pm.Will continue to observe.

## 2017-06-05 NOTE — PROVIDER CONTACT NOTE (OTHER) - ACTION/TREATMENT ORDERED:
Pt seen by HERIBERTO Pollard.Will continue to observe. Pt seen by HERIBERTO Pollard.Will continue to observe.No further action at this time.

## 2017-06-06 ENCOUNTER — TRANSCRIPTION ENCOUNTER (OUTPATIENT)
Age: 32
End: 2017-06-06

## 2017-06-06 RX ADMIN — Medication 100 MILLIGRAM(S): at 08:16

## 2017-06-06 RX ADMIN — Medication 1 APPLICATION(S): at 21:16

## 2017-06-06 RX ADMIN — Medication 975 MILLIGRAM(S): at 22:00

## 2017-06-06 RX ADMIN — Medication 600 MILLIGRAM(S): at 19:20

## 2017-06-06 RX ADMIN — Medication 975 MILLIGRAM(S): at 09:15

## 2017-06-06 RX ADMIN — Medication 600 MILLIGRAM(S): at 00:45

## 2017-06-06 RX ADMIN — Medication 975 MILLIGRAM(S): at 02:35

## 2017-06-06 RX ADMIN — Medication 975 MILLIGRAM(S): at 14:55

## 2017-06-06 RX ADMIN — OXYCODONE HYDROCHLORIDE 5 MILLIGRAM(S): 5 TABLET ORAL at 17:08

## 2017-06-06 RX ADMIN — Medication 975 MILLIGRAM(S): at 15:55

## 2017-06-06 RX ADMIN — Medication 975 MILLIGRAM(S): at 03:30

## 2017-06-06 RX ADMIN — Medication 1 TABLET(S): at 12:44

## 2017-06-06 RX ADMIN — Medication 600 MILLIGRAM(S): at 06:15

## 2017-06-06 RX ADMIN — Medication 600 MILLIGRAM(S): at 12:44

## 2017-06-06 RX ADMIN — Medication 975 MILLIGRAM(S): at 08:16

## 2017-06-06 RX ADMIN — OXYCODONE HYDROCHLORIDE 5 MILLIGRAM(S): 5 TABLET ORAL at 18:00

## 2017-06-06 RX ADMIN — Medication 600 MILLIGRAM(S): at 13:44

## 2017-06-06 RX ADMIN — Medication 600 MILLIGRAM(S): at 06:50

## 2017-06-06 RX ADMIN — Medication 600 MILLIGRAM(S): at 18:22

## 2017-06-06 RX ADMIN — Medication 975 MILLIGRAM(S): at 21:16

## 2017-06-06 NOTE — LACTATION INITIAL EVALUATION - INTERVENTION OUTCOME
verbalizes understanding/nbn demonstrated  deep latch and  performed  with sucking and swallowing  noted   ,  offered  assistance  as  needed.

## 2017-06-06 NOTE — DISCHARGE NOTE OB - HOSPITAL COURSE
Patient had uncomplicated, nonsurgical vaginal delivery.  Please see delivery note for details.  During postpartum course patient's vitals were stable, vaginal bleeding appropriate, and pain well controlled.  On day of discharge patient was ambulating, her pain controlled with oral medications, had adequate oral intake, and was voiding freely.  Discharge instructions and precautions were given.  Will return to clinic in 6 weeks for postpartum visit.  Postpartum birth control plan is condoms.

## 2017-06-06 NOTE — DISCHARGE NOTE OB - MEDICATION SUMMARY - MEDICATIONS TO TAKE
I will START or STAY ON the medications listed below when I get home from the hospital:    acetaminophen 325 mg oral tablet  -- 3 tab(s) by mouth every 6 hours, As Needed  -- Indication: For Pain as needed     mg oral tablet  -- 1 tab(s) by mouth every 6 hours  -- Indication: For Pain as needed    Prenatal Multivitamins with Folic Acid 1 mg oral tablet  -- 1 tab(s) by mouth once a day  -- Indication: For Wellness

## 2017-06-06 NOTE — DISCHARGE NOTE OB - PROVIDER TOKENS
FREE:[LAST:[[facility]],PHONE:[(   )    -],FAX:[(   )    -],ADDRESS:[Please follow up with us 6 weeks after your delivery date for vaginal deliveries or 2 weeks after your delivery date for  sections.      Your postpartum visit will be at the Ambulatory Clinic Unit, [facility]: [facility], 3rd floor.    Please schedule an appointment (PHONE # [phone], call after 9 am) or visit during the walk-in hours as follows: MONDAY 3-6 PM, WEDNESDAY 3-6 PM, FRIDAY 9-11 AM; 1-3 PM]]

## 2017-06-06 NOTE — DISCHARGE NOTE OB - CARE PROVIDER_API CALL
[facility],   Please follow up with us 6 weeks after your delivery date for vaginal deliveries or 2 weeks after your delivery date for  sections.      Your postpartum visit will be at the Ambulatory Clinic Unit, [facility]: [facility], 3rd floor.    Please schedule an appointment (PHONE # [phone], call after 9 am) or visit during the walk-in hours as follows: MONDAY 3-6 PM, WEDNESDAY 3-6 PM, FRIDAY 9-11 AM; 1-3 PM  Phone: (   )    -  Fax: (   )    -

## 2017-06-06 NOTE — DISCHARGE NOTE OB - CARE PLAN
Principal Discharge DX:	Postpartum normal course  Goal:	Wellness  Instructions for follow-up, activity and diet:	After discharge, please stay on pelvic rest for 6 weeks, meaning no sexual intercourse, no tampons and no douching.  No driving for 2 weeks as women can loose a lot of blood during delivery and there is a possibility of being lightheaded/fainting.  No lifting objects heavier than baby for two weeks.  Expect to have vaginal bleeding/spotting for up to six weeks.  The bleeding should get lighter and more white/light brown with time.  For bleeding soaking more than a pad an hour or passing clots greater than the size of your fist, come in to the emergency department.    Follow up in clinic in 6 weeks.

## 2017-06-06 NOTE — DISCHARGE NOTE OB - PATIENT PORTAL LINK FT
“You can access the FollowHealth Patient Portal, offered by [facility], by registering with the following website: [email or web address]”

## 2017-06-06 NOTE — LACTATION INITIAL EVALUATION - LACTATION INTERVENTIONS
assisted with deep latch and positioning  discussed  signs  of  effective  feeding and  swallowing.   discussed  compression at  breast when  nbn  stops  drinking  and  is  still sucking.   ,/initiate skin to skin/initiate hand expression routine

## 2017-06-06 NOTE — PROGRESS NOTE ADULT - SUBJECTIVE AND OBJECTIVE BOX
Patient seen and examined at bedside, no acute overnight events. Pt complaining of some continued vaginal discomfort.  Patient is ambulating, voiding spontaneously, passing gas, and tolerating regular diet. Pt is breast feeding her baby.    Vital Signs Last 24 Hours  T(C): 36.7, Max: 36.8 (06-05 @ 10:27)  HR: 82 (71 - 113)  BP: 127/72 (126/86 - 157/81)  RR: 18 (17 - 18)  SpO2: 100% (100% - 100%)    Physical Exam:  General: NAD  Abdomen: Soft, non-tender, non-distended, fundus firm  Pelvic: Lochia wnl    Labs:    Blood Type: O Positive               11.0   9.58  )-----------( 206      ( 06-05 @ 03:21 )             34.1                10.9   8.27  )-----------( 211      ( 06-04 @ 15:00 )             34.2         MEDICATIONS  (STANDING):  diphtheria/tetanus/pertussis (acellular) Vaccine (ADAcel) 0.5milliLiter(s) IntraMuscular once  prenatal multivitamin 1Tablet(s) Oral daily  oxyCODONE IR 5milliGRAM(s) Oral every 3 hours  acetaminophen   Tablet. 975milliGRAM(s) Oral every 6 hours  ibuprofen  Tablet 600milliGRAM(s) Oral every 6 hours    MEDICATIONS  (PRN):  dibucaine 1% Ointment 1Application(s) Topical every 4 hours PRN Perineal Discomfort  lanolin Ointment 1Application(s) Topical every 6 hours PRN Sore Nipples  witch hazel Pads 1Application(s) Topical every 4 hours PRN Perineal Discomfort  simethicone 80milliGRAM(s) Chew every 6 hours PRN Gas  diphenhydrAMINE   Capsule 25milliGRAM(s) Oral every 6 hours PRN Itching  glycerin Suppository - Adult 1Suppository(s) Rectal at bedtime PRN Constipation  magnesium hydroxide Suspension 30milliLiter(s) Oral two times a day PRN Constipation  docusate sodium 100milliGRAM(s) Oral two times a day PRN Stool Softening  oxyCODONE IR 5milliGRAM(s) Oral every 4 hours PRN Severe Pain (7 -10)  hydrocortisone 1% Cream 1Application(s) Topical every 4 hours PRN perineal discomfort  pramoxine 1%/zinc 5% Cream 1Application(s) Topical every 4 hours PRN perineal discomfort

## 2017-06-06 NOTE — DISCHARGE NOTE OB - ADDITIONAL INSTRUCTIONS
Please call for  follow up  postpartum visit within 4-6  weeks of delivery date,  at Ambulatory Clinic Unit : [facility] :  [employer], 3rd floor : phone # [phone] or walk-in hours are: MONDAY 3-6 pm, WEDNESDAY 3-6 pm, FRIDAY 9-11 am, 1-3 pm

## 2017-06-07 VITALS
DIASTOLIC BLOOD PRESSURE: 82 MMHG | HEART RATE: 91 BPM | SYSTOLIC BLOOD PRESSURE: 139 MMHG | RESPIRATION RATE: 18 BRPM | OXYGEN SATURATION: 100 % | TEMPERATURE: 98 F

## 2017-06-07 RX ORDER — ACETAMINOPHEN 500 MG
3 TABLET ORAL
Qty: 0 | Refills: 0 | DISCHARGE
Start: 2017-06-07

## 2017-06-07 RX ORDER — IBUPROFEN 200 MG
1 TABLET ORAL
Qty: 0 | Refills: 0 | DISCHARGE
Start: 2017-06-07

## 2017-06-07 RX ORDER — MEASLES,MUMPS,RUB,VARICELLA/PF 3-4.3-3
0.5 VIAL (EA) SUBCUTANEOUS ONCE
Qty: 0 | Refills: 0 | Status: COMPLETED | OUTPATIENT
Start: 2017-06-07 | End: 2017-06-07

## 2017-06-07 RX ADMIN — Medication 975 MILLIGRAM(S): at 06:45

## 2017-06-07 RX ADMIN — Medication 600 MILLIGRAM(S): at 04:00

## 2017-06-07 RX ADMIN — Medication 0.5 MILLILITER(S): at 10:03

## 2017-06-07 RX ADMIN — OXYCODONE HYDROCHLORIDE 5 MILLIGRAM(S): 5 TABLET ORAL at 09:16

## 2017-06-07 RX ADMIN — Medication 600 MILLIGRAM(S): at 03:13

## 2017-06-07 RX ADMIN — Medication 975 MILLIGRAM(S): at 06:02

## 2017-06-07 RX ADMIN — Medication 1 TABLET(S): at 09:16

## 2017-06-07 NOTE — PROGRESS NOTE ADULT - PROBLEM SELECTOR PLAN 1
- Continue with po analgesia  - Increase ambulation  - Continue regular diet  - IV lock  - No labs  - Discharge to home

## 2017-06-07 NOTE — PROGRESS NOTE ADULT - ASSESSMENT
30y/o  PPD#2 from  in stable condition. Current issues include gHTN w/ normal HELLP labs and normal BPs postpartum.

## 2017-06-07 NOTE — PROGRESS NOTE ADULT - SUBJECTIVE AND OBJECTIVE BOX
Patient seen and examined at bedside, no acute overnight events. No acute complaints, pain well controlled.  Patient is ambulating, voiding spontaneously, passing gas, and tolerating regular diet. Pt is breast feeding her baby.    Vital Signs Last 24 Hours  T(C): 36.9, Max: 37.1 (06-06 @ 14:00)  HR: 91 (84 - 91)  BP: 139/82 (116/64 - 139/82)  RR: 18 (18 - 18)  SpO2: 100% (100% - 100%)    Physical Exam:  General: NAD  Abdomen: Soft, non-tender, non-distended, fundus firm  Pelvic: Lochia wnl    Labs:    Blood Type: O Positive               11.0   9.58  )-----------( 206      ( 06-05 @ 03:21 )             34.1                10.9   8.27  )-----------( 211      ( 06-04 @ 15:00 )             34.2         MEDICATIONS  (STANDING):  diphtheria/tetanus/pertussis (acellular) Vaccine (ADAcel) 0.5milliLiter(s) IntraMuscular once  prenatal multivitamin 1Tablet(s) Oral daily  oxyCODONE IR 5milliGRAM(s) Oral every 3 hours  acetaminophen   Tablet. 975milliGRAM(s) Oral every 6 hours    MEDICATIONS  (PRN):  dibucaine 1% Ointment 1Application(s) Topical every 4 hours PRN Perineal Discomfort  lanolin Ointment 1Application(s) Topical every 6 hours PRN Sore Nipples  witch hazel Pads 1Application(s) Topical every 4 hours PRN Perineal Discomfort  simethicone 80milliGRAM(s) Chew every 6 hours PRN Gas  diphenhydrAMINE   Capsule 25milliGRAM(s) Oral every 6 hours PRN Itching  glycerin Suppository - Adult 1Suppository(s) Rectal at bedtime PRN Constipation  magnesium hydroxide Suspension 30milliLiter(s) Oral two times a day PRN Constipation  docusate sodium 100milliGRAM(s) Oral two times a day PRN Stool Softening  oxyCODONE IR 5milliGRAM(s) Oral every 4 hours PRN Severe Pain (7 -10)  hydrocortisone 1% Cream 1Application(s) Topical every 4 hours PRN perineal discomfort  pramoxine 1%/zinc 5% Cream 1Application(s) Topical every 4 hours PRN perineal discomfort

## 2017-06-09 LAB — T PALLIDUM AB TITR SER: NEGATIVE — SIGNIFICANT CHANGE UP

## 2017-06-27 ENCOUNTER — APPOINTMENT (OUTPATIENT)
Dept: OBGYN | Facility: CLINIC | Age: 32
End: 2017-06-27

## 2017-06-30 ENCOUNTER — TRANSCRIPTION ENCOUNTER (OUTPATIENT)
Age: 32
End: 2017-06-30

## 2017-07-10 ENCOUNTER — OUTPATIENT (OUTPATIENT)
Dept: OUTPATIENT SERVICES | Facility: HOSPITAL | Age: 32
LOS: 1 days | End: 2017-07-10
Payer: MEDICAID

## 2017-07-10 ENCOUNTER — APPOINTMENT (OUTPATIENT)
Dept: OBGYN | Facility: CLINIC | Age: 32
End: 2017-07-10

## 2017-07-10 VITALS
BODY MASS INDEX: 23.07 KG/M2 | TEMPERATURE: 97.5 F | HEIGHT: 67 IN | WEIGHT: 147 LBS | DIASTOLIC BLOOD PRESSURE: 66 MMHG | SYSTOLIC BLOOD PRESSURE: 110 MMHG | HEART RATE: 68 BPM

## 2017-07-10 DIAGNOSIS — Z34.00 ENCOUNTER FOR SUPERVISION OF NORMAL FIRST PREGNANCY, UNSPECIFIED TRIMESTER: ICD-10-CM

## 2017-07-10 PROCEDURE — G0463: CPT

## 2019-05-07 ENCOUNTER — RESULT REVIEW (OUTPATIENT)
Age: 34
End: 2019-05-07

## 2019-05-08 ENCOUNTER — APPOINTMENT (OUTPATIENT)
Dept: OBGYN | Facility: CLINIC | Age: 34
End: 2019-05-08
Payer: MEDICAID

## 2019-05-08 ENCOUNTER — OUTPATIENT (OUTPATIENT)
Dept: OUTPATIENT SERVICES | Facility: HOSPITAL | Age: 34
LOS: 1 days | End: 2019-05-08
Payer: MEDICAID

## 2019-05-08 ENCOUNTER — NON-APPOINTMENT (OUTPATIENT)
Age: 34
End: 2019-05-08

## 2019-05-08 VITALS
SYSTOLIC BLOOD PRESSURE: 105 MMHG | WEIGHT: 138 LBS | BODY MASS INDEX: 21.66 KG/M2 | DIASTOLIC BLOOD PRESSURE: 67 MMHG | HEIGHT: 67 IN

## 2019-05-08 DIAGNOSIS — Z34.00 ENCOUNTER FOR SUPERVISION OF NORMAL FIRST PREGNANCY, UNSPECIFIED TRIMESTER: ICD-10-CM

## 2019-05-08 DIAGNOSIS — Z34.03 ENCOUNTER FOR SUPERVISION OF NORMAL FIRST PREGNANCY, THIRD TRIMESTER: ICD-10-CM

## 2019-05-08 LAB
APPEARANCE UR: ABNORMAL
APTT BLD: 30.7 SEC — SIGNIFICANT CHANGE UP (ref 27.5–36.3)
BILIRUB UR-MCNC: NEGATIVE — SIGNIFICANT CHANGE UP
COLOR SPEC: YELLOW — SIGNIFICANT CHANGE UP
DIFF PNL FLD: NEGATIVE — SIGNIFICANT CHANGE UP
GLUCOSE UR QL: NEGATIVE — SIGNIFICANT CHANGE UP
INR BLD: 1.03 RATIO — SIGNIFICANT CHANGE UP (ref 0.88–1.16)
KETONES UR-MCNC: NEGATIVE — SIGNIFICANT CHANGE UP
LEUKOCYTE ESTERASE UR-ACNC: NEGATIVE — SIGNIFICANT CHANGE UP
NITRITE UR-MCNC: NEGATIVE — SIGNIFICANT CHANGE UP
PH UR: 8.5 — HIGH (ref 5–8)
PROT UR-MCNC: SIGNIFICANT CHANGE UP
PROTHROM AB SERPL-ACNC: 11.7 SEC — SIGNIFICANT CHANGE UP (ref 10–13.1)
SP GR SPEC: 1.02 — SIGNIFICANT CHANGE UP (ref 1.01–1.02)
UROBILINOGEN FLD QL: SIGNIFICANT CHANGE UP

## 2019-05-08 PROCEDURE — 81099 UNLISTED URINALYSIS PX: CPT | Mod: NC

## 2019-05-08 PROCEDURE — 99214 OFFICE O/P EST MOD 30 MIN: CPT | Mod: NC

## 2019-05-08 PROCEDURE — 86787 VARICELLA-ZOSTER ANTIBODY: CPT

## 2019-05-08 PROCEDURE — 82247 BILIRUBIN TOTAL: CPT

## 2019-05-08 PROCEDURE — 85610 PROTHROMBIN TIME: CPT

## 2019-05-08 PROCEDURE — 36415 COLL VENOUS BLD VENIPUNCTURE: CPT | Mod: NC

## 2019-05-08 PROCEDURE — 80053 COMPREHEN METABOLIC PANEL: CPT

## 2019-05-08 PROCEDURE — 87389 HIV-1 AG W/HIV-1&-2 AB AG IA: CPT

## 2019-05-08 PROCEDURE — 86762 RUBELLA ANTIBODY: CPT

## 2019-05-08 PROCEDURE — 83655 ASSAY OF LEAD: CPT

## 2019-05-08 PROCEDURE — 81025 URINE PREGNANCY TEST: CPT | Mod: NC

## 2019-05-08 PROCEDURE — 36415 COLL VENOUS BLD VENIPUNCTURE: CPT

## 2019-05-08 PROCEDURE — 83020 HEMOGLOBIN ELECTROPHORESIS: CPT

## 2019-05-08 PROCEDURE — 86480 TB TEST CELL IMMUN MEASURE: CPT

## 2019-05-08 PROCEDURE — 81025 URINE PREGNANCY TEST: CPT

## 2019-05-08 PROCEDURE — 84550 ASSAY OF BLOOD/URIC ACID: CPT

## 2019-05-08 PROCEDURE — 87624 HPV HI-RISK TYP POOLED RSLT: CPT

## 2019-05-08 PROCEDURE — 84156 ASSAY OF PROTEIN URINE: CPT

## 2019-05-08 PROCEDURE — 86780 TREPONEMA PALLIDUM: CPT

## 2019-05-08 PROCEDURE — 81220 CFTR GENE COM VARIANTS: CPT

## 2019-05-08 PROCEDURE — 87086 URINE CULTURE/COLONY COUNT: CPT

## 2019-05-08 PROCEDURE — 82248 BILIRUBIN DIRECT: CPT

## 2019-05-08 PROCEDURE — 86901 BLOOD TYPING SEROLOGIC RH(D): CPT

## 2019-05-08 PROCEDURE — 81003 URINALYSIS AUTO W/O SCOPE: CPT

## 2019-05-08 PROCEDURE — 86850 RBC ANTIBODY SCREEN: CPT

## 2019-05-08 PROCEDURE — 86900 BLOOD TYPING SEROLOGIC ABO: CPT

## 2019-05-08 PROCEDURE — 82570 ASSAY OF URINE CREATININE: CPT

## 2019-05-08 PROCEDURE — 81001 URINALYSIS AUTO W/SCOPE: CPT

## 2019-05-08 PROCEDURE — 83036 HEMOGLOBIN GLYCOSYLATED A1C: CPT

## 2019-05-08 PROCEDURE — 83020 HEMOGLOBIN ELECTROPHORESIS: CPT | Mod: 26

## 2019-05-08 PROCEDURE — 85730 THROMBOPLASTIN TIME PARTIAL: CPT

## 2019-05-08 PROCEDURE — G0463: CPT

## 2019-05-08 RX ORDER — PNV/FERROUS SULFATE/FOLIC ACID 27-<0.5MG
TABLET ORAL
Refills: 0 | Status: DISCONTINUED | COMMUNITY
End: 2019-05-08

## 2019-05-08 RX ORDER — FERROUS SULFATE 325(65) MG
325 (65 FE) TABLET ORAL
Refills: 0 | Status: DISCONTINUED | COMMUNITY
End: 2019-05-08

## 2019-05-08 RX ORDER — FOLIC ACID 20 MG
CAPSULE ORAL
Refills: 0 | Status: DISCONTINUED | COMMUNITY
End: 2019-05-08

## 2019-05-08 RX ORDER — CHLORHEXIDINE GLUCONATE 4 %
325 (65 FE) LIQUID (ML) TOPICAL TWICE DAILY
Qty: 60 | Refills: 1 | Status: DISCONTINUED | COMMUNITY
Start: 2017-05-19 | End: 2019-05-08

## 2019-05-09 DIAGNOSIS — O09.299 SUPERVISION OF PREGNANCY WITH OTHER POOR REPRODUCTIVE OR OBSTETRIC HISTORY, UNSPECIFIED TRIMESTER: ICD-10-CM

## 2019-05-09 DIAGNOSIS — D57.3 SICKLE-CELL TRAIT: ICD-10-CM

## 2019-05-09 DIAGNOSIS — Z3A.09 9 WEEKS GESTATION OF PREGNANCY: ICD-10-CM

## 2019-05-09 DIAGNOSIS — Z34.92 ENCOUNTER FOR SUPERVISION OF NORMAL PREGNANCY, UNSPECIFIED, SECOND TRIMESTER: ICD-10-CM

## 2019-05-09 LAB
ALBUMIN SERPL ELPH-MCNC: 4.1 G/DL — SIGNIFICANT CHANGE UP (ref 3.3–5)
ALP SERPL-CCNC: 36 U/L — LOW (ref 40–120)
ALT FLD-CCNC: 12 U/L — SIGNIFICANT CHANGE UP (ref 10–45)
ANION GAP SERPL CALC-SCNC: 12 MMOL/L — SIGNIFICANT CHANGE UP (ref 5–17)
AST SERPL-CCNC: 16 U/L — SIGNIFICANT CHANGE UP (ref 10–40)
BASOPHILS # BLD AUTO: 0.03 K/UL — SIGNIFICANT CHANGE UP (ref 0–0.2)
BASOPHILS NFR BLD AUTO: 0.6 % — SIGNIFICANT CHANGE UP (ref 0–2)
BILIRUB SERPL-MCNC: <0.2 MG/DL — SIGNIFICANT CHANGE UP (ref 0.2–1.2)
BUN SERPL-MCNC: 9 MG/DL — SIGNIFICANT CHANGE UP (ref 7–23)
CALCIUM SERPL-MCNC: 9.3 MG/DL — SIGNIFICANT CHANGE UP (ref 8.4–10.5)
CHLORIDE SERPL-SCNC: 104 MMOL/L — SIGNIFICANT CHANGE UP (ref 96–108)
CO2 SERPL-SCNC: 21 MMOL/L — LOW (ref 22–31)
CREAT ?TM UR-MCNC: 108 MG/DL — SIGNIFICANT CHANGE UP
CREAT SERPL-MCNC: 0.62 MG/DL — SIGNIFICANT CHANGE UP (ref 0.5–1.3)
EOSINOPHIL # BLD AUTO: 0.15 K/UL — SIGNIFICANT CHANGE UP (ref 0–0.5)
EOSINOPHIL NFR BLD AUTO: 2.8 % — SIGNIFICANT CHANGE UP (ref 0–6)
GLUCOSE SERPL-MCNC: 56 MG/DL — LOW (ref 70–99)
HBA1C BLD-MCNC: 4.7 % — SIGNIFICANT CHANGE UP (ref 4–5.6)
HCT VFR BLD CALC: 31.3 % — LOW (ref 34.5–45)
HEMOGLOBIN INTERPRETATION: SIGNIFICANT CHANGE UP
HGB A MFR BLD: 62 % — LOW (ref 95.8–98)
HGB A2 MFR BLD: 3.1 % — SIGNIFICANT CHANGE UP (ref 2–3.2)
HGB BLD-MCNC: 10.1 G/DL — LOW (ref 11.5–15.5)
HGB S MFR BLD: 34.9 % — HIGH
HIV 1+2 AB+HIV1 P24 AG SERPL QL IA: SIGNIFICANT CHANGE UP
IMM GRANULOCYTES NFR BLD AUTO: 0.2 % — SIGNIFICANT CHANGE UP (ref 0–1.5)
LEAD BLD-MCNC: 1 UG/DL — SIGNIFICANT CHANGE UP (ref 0–4)
LYMPHOCYTES # BLD AUTO: 1.63 K/UL — SIGNIFICANT CHANGE UP (ref 1–3.3)
LYMPHOCYTES # BLD AUTO: 30.8 % — SIGNIFICANT CHANGE UP (ref 13–44)
MCHC RBC-ENTMCNC: 27.1 PG — SIGNIFICANT CHANGE UP (ref 27–34)
MCHC RBC-ENTMCNC: 32.3 GM/DL — SIGNIFICANT CHANGE UP (ref 32–36)
MCV RBC AUTO: 83.9 FL — SIGNIFICANT CHANGE UP (ref 80–100)
MONOCYTES # BLD AUTO: 0.41 K/UL — SIGNIFICANT CHANGE UP (ref 0–0.9)
MONOCYTES NFR BLD AUTO: 7.7 % — SIGNIFICANT CHANGE UP (ref 2–14)
NEUTROPHILS # BLD AUTO: 3.07 K/UL — SIGNIFICANT CHANGE UP (ref 1.8–7.4)
NEUTROPHILS NFR BLD AUTO: 57.9 % — SIGNIFICANT CHANGE UP (ref 43–77)
PLATELET # BLD AUTO: 224 K/UL — SIGNIFICANT CHANGE UP (ref 150–400)
POTASSIUM SERPL-MCNC: 3.7 MMOL/L — SIGNIFICANT CHANGE UP (ref 3.5–5.3)
POTASSIUM SERPL-SCNC: 3.7 MMOL/L — SIGNIFICANT CHANGE UP (ref 3.5–5.3)
PROT ?TM UR-MCNC: 10 MG/DL — SIGNIFICANT CHANGE UP (ref 0–12)
PROT SERPL-MCNC: 7.5 G/DL — SIGNIFICANT CHANGE UP (ref 6–8.3)
PROT/CREAT UR-RTO: 0.1 RATIO — SIGNIFICANT CHANGE UP (ref 0–0.2)
RBC # BLD: 3.73 M/UL — LOW (ref 3.8–5.2)
RBC # FLD: 15.9 % — HIGH (ref 10.3–14.5)
RUBV IGG SER-ACNC: 1.8 INDEX — SIGNIFICANT CHANGE UP
RUBV IGG SER-IMP: POSITIVE — SIGNIFICANT CHANGE UP
SODIUM SERPL-SCNC: 137 MMOL/L — SIGNIFICANT CHANGE UP (ref 135–145)
T PALLIDUM AB TITR SER: NEGATIVE — SIGNIFICANT CHANGE UP
URATE SERPL-MCNC: 2.1 MG/DL — LOW (ref 2.5–7)
VZV IGG FLD QL IA: 876.2 INDEX — SIGNIFICANT CHANGE UP
VZV IGG FLD QL IA: POSITIVE — SIGNIFICANT CHANGE UP
WBC # BLD: 5.3 K/UL — SIGNIFICANT CHANGE UP (ref 3.8–10.5)
WBC # FLD AUTO: 5.3 K/UL — SIGNIFICANT CHANGE UP (ref 3.8–10.5)

## 2019-05-10 LAB
CULTURE RESULTS: SIGNIFICANT CHANGE UP
SPECIMEN SOURCE: SIGNIFICANT CHANGE UP

## 2019-05-13 LAB
CYSTIC FIBROSIS EXPANDED PANEL: SIGNIFICANT CHANGE UP
GAMMA INTERFERON BACKGROUND BLD IA-ACNC: 0.02 IU/ML — SIGNIFICANT CHANGE UP
M TB IFN-G BLD-IMP: NEGATIVE — SIGNIFICANT CHANGE UP
M TB IFN-G CD4+ BCKGRND COR BLD-ACNC: 0 IU/ML — SIGNIFICANT CHANGE UP
M TB IFN-G CD4+CD8+ BCKGRND COR BLD-ACNC: 0 IU/ML — SIGNIFICANT CHANGE UP
QUANT TB PLUS MITOGEN MINUS NIL: 8.21 IU/ML — SIGNIFICANT CHANGE UP

## 2019-05-14 ENCOUNTER — APPOINTMENT (OUTPATIENT)
Dept: ANTEPARTUM | Facility: CLINIC | Age: 34
End: 2019-05-14
Payer: MEDICAID

## 2019-05-14 ENCOUNTER — ASOB RESULT (OUTPATIENT)
Age: 34
End: 2019-05-14

## 2019-05-14 ENCOUNTER — LABORATORY RESULT (OUTPATIENT)
Age: 34
End: 2019-05-14

## 2019-05-14 ENCOUNTER — OUTPATIENT (OUTPATIENT)
Dept: OUTPATIENT SERVICES | Facility: HOSPITAL | Age: 34
LOS: 1 days | End: 2019-05-14
Payer: MEDICAID

## 2019-05-14 DIAGNOSIS — Z01.818 ENCOUNTER FOR OTHER PREPROCEDURAL EXAMINATION: ICD-10-CM

## 2019-05-14 PROCEDURE — 76801 OB US < 14 WKS SINGLE FETUS: CPT

## 2019-05-14 PROCEDURE — 88275 CYTOGENETICS 100-300: CPT

## 2019-05-14 PROCEDURE — 88271 CYTOGENETICS DNA PROBE: CPT

## 2019-05-14 PROCEDURE — 99201 OFFICE OUTPATIENT NEW 10 MINUTES: CPT | Mod: 25

## 2019-05-14 PROCEDURE — 59015 CHORION BIOPSY: CPT

## 2019-05-14 PROCEDURE — 88291 CYTO/MOLECULAR REPORT: CPT

## 2019-05-14 PROCEDURE — 88235 TISSUE CULTURE PLACENTA: CPT

## 2019-05-14 PROCEDURE — 76945 ECHO GUIDE VILLUS SAMPLING: CPT | Mod: 26

## 2019-05-14 PROCEDURE — 76945 ECHO GUIDE VILLUS SAMPLING: CPT

## 2019-05-14 PROCEDURE — 88280 CHROMOSOME KARYOTYPE STUDY: CPT

## 2019-05-14 PROCEDURE — 88267 CHROMOSOME ANALYS PLACENTA: CPT

## 2019-05-14 PROCEDURE — 88285 CHROMOSOME COUNT ADDITIONAL: CPT

## 2019-07-08 ENCOUNTER — NON-APPOINTMENT (OUTPATIENT)
Age: 34
End: 2019-07-08

## 2019-07-08 ENCOUNTER — APPOINTMENT (OUTPATIENT)
Dept: OBGYN | Facility: CLINIC | Age: 34
End: 2019-07-08
Payer: MEDICAID

## 2019-07-08 ENCOUNTER — OUTPATIENT (OUTPATIENT)
Dept: OUTPATIENT SERVICES | Facility: HOSPITAL | Age: 34
LOS: 1 days | End: 2019-07-08
Payer: MEDICAID

## 2019-07-08 ENCOUNTER — APPOINTMENT (OUTPATIENT)
Dept: OBGYN | Facility: CLINIC | Age: 34
End: 2019-07-08

## 2019-07-08 VITALS
BODY MASS INDEX: 22.91 KG/M2 | WEIGHT: 146 LBS | HEIGHT: 67 IN | DIASTOLIC BLOOD PRESSURE: 68 MMHG | SYSTOLIC BLOOD PRESSURE: 106 MMHG

## 2019-07-08 DIAGNOSIS — Z34.00 ENCOUNTER FOR SUPERVISION OF NORMAL FIRST PREGNANCY, UNSPECIFIED TRIMESTER: ICD-10-CM

## 2019-07-08 PROCEDURE — 82105 ALPHA-FETOPROTEIN SERUM: CPT

## 2019-07-08 PROCEDURE — 87086 URINE CULTURE/COLONY COUNT: CPT

## 2019-07-08 PROCEDURE — G0463: CPT

## 2019-07-08 PROCEDURE — 81003 URINALYSIS AUTO W/O SCOPE: CPT

## 2019-07-08 PROCEDURE — 99213 OFFICE O/P EST LOW 20 MIN: CPT

## 2019-07-09 DIAGNOSIS — O09.299 SUPERVISION OF PREGNANCY WITH OTHER POOR REPRODUCTIVE OR OBSTETRIC HISTORY, UNSPECIFIED TRIMESTER: ICD-10-CM

## 2019-07-09 DIAGNOSIS — Z34.92 ENCOUNTER FOR SUPERVISION OF NORMAL PREGNANCY, UNSPECIFIED, SECOND TRIMESTER: ICD-10-CM

## 2019-07-09 DIAGNOSIS — D57.3 SICKLE-CELL TRAIT: ICD-10-CM

## 2019-07-09 DIAGNOSIS — Z3A.19 19 WEEKS GESTATION OF PREGNANCY: ICD-10-CM

## 2019-07-09 LAB
APPEARANCE UR: CLEAR — SIGNIFICANT CHANGE UP
BILIRUB UR-MCNC: NEGATIVE — SIGNIFICANT CHANGE UP
COLOR SPEC: SIGNIFICANT CHANGE UP
DIFF PNL FLD: NEGATIVE — SIGNIFICANT CHANGE UP
GLUCOSE UR QL: NEGATIVE — SIGNIFICANT CHANGE UP
KETONES UR-MCNC: NEGATIVE — SIGNIFICANT CHANGE UP
LEUKOCYTE ESTERASE UR-ACNC: NEGATIVE — SIGNIFICANT CHANGE UP
NITRITE UR-MCNC: NEGATIVE — SIGNIFICANT CHANGE UP
PH UR: 6 — SIGNIFICANT CHANGE UP (ref 5–8)
PROT UR-MCNC: NEGATIVE — SIGNIFICANT CHANGE UP
SP GR SPEC: 1.02 — SIGNIFICANT CHANGE UP (ref 1.01–1.02)
UROBILINOGEN FLD QL: SIGNIFICANT CHANGE UP

## 2019-07-10 LAB
2ND TRIMESTER DATA: SIGNIFICANT CHANGE UP
AFP SERPL-ACNC: SIGNIFICANT CHANGE UP
CLINICAL BIOCHEMIST REVIEW: SIGNIFICANT CHANGE UP
CULTURE RESULTS: SIGNIFICANT CHANGE UP
DEMOGRAPHIC DATA: SIGNIFICANT CHANGE UP
SCREEN-FOOTER: SIGNIFICANT CHANGE UP
SPECIMEN SOURCE: SIGNIFICANT CHANGE UP

## 2019-07-25 ENCOUNTER — ASOB RESULT (OUTPATIENT)
Age: 34
End: 2019-07-25

## 2019-07-25 ENCOUNTER — APPOINTMENT (OUTPATIENT)
Dept: ANTEPARTUM | Facility: CLINIC | Age: 34
End: 2019-07-25
Payer: MEDICAID

## 2019-07-25 PROCEDURE — 76817 TRANSVAGINAL US OBSTETRIC: CPT

## 2019-07-25 PROCEDURE — 76811 OB US DETAILED SNGL FETUS: CPT

## 2019-08-05 ENCOUNTER — APPOINTMENT (OUTPATIENT)
Dept: OBGYN | Facility: CLINIC | Age: 34
End: 2019-08-05
Payer: MEDICAID

## 2019-08-05 ENCOUNTER — OUTPATIENT (OUTPATIENT)
Dept: OUTPATIENT SERVICES | Facility: HOSPITAL | Age: 34
LOS: 1 days | End: 2019-08-05
Payer: MEDICAID

## 2019-08-05 ENCOUNTER — NON-APPOINTMENT (OUTPATIENT)
Age: 34
End: 2019-08-05

## 2019-08-05 VITALS
BODY MASS INDEX: 23.7 KG/M2 | DIASTOLIC BLOOD PRESSURE: 67 MMHG | WEIGHT: 151 LBS | HEIGHT: 67 IN | SYSTOLIC BLOOD PRESSURE: 103 MMHG

## 2019-08-05 DIAGNOSIS — Z34.00 ENCOUNTER FOR SUPERVISION OF NORMAL FIRST PREGNANCY, UNSPECIFIED TRIMESTER: ICD-10-CM

## 2019-08-05 PROCEDURE — 87086 URINE CULTURE/COLONY COUNT: CPT

## 2019-08-05 PROCEDURE — 99213 OFFICE O/P EST LOW 20 MIN: CPT

## 2019-08-05 PROCEDURE — 81003 URINALYSIS AUTO W/O SCOPE: CPT

## 2019-08-05 PROCEDURE — G0463: CPT

## 2019-08-06 DIAGNOSIS — D57.3 SICKLE-CELL TRAIT: ICD-10-CM

## 2019-08-06 DIAGNOSIS — Z34.92 ENCOUNTER FOR SUPERVISION OF NORMAL PREGNANCY, UNSPECIFIED, SECOND TRIMESTER: ICD-10-CM

## 2019-08-06 DIAGNOSIS — Z3A.23 23 WEEKS GESTATION OF PREGNANCY: ICD-10-CM

## 2019-08-07 LAB
CULTURE RESULTS: SIGNIFICANT CHANGE UP
SPECIMEN SOURCE: SIGNIFICANT CHANGE UP

## 2019-10-07 ENCOUNTER — OUTPATIENT (OUTPATIENT)
Dept: OUTPATIENT SERVICES | Facility: HOSPITAL | Age: 34
LOS: 1 days | End: 2019-10-07
Payer: MEDICAID

## 2019-10-07 ENCOUNTER — NON-APPOINTMENT (OUTPATIENT)
Age: 34
End: 2019-10-07

## 2019-10-07 ENCOUNTER — APPOINTMENT (OUTPATIENT)
Dept: OBGYN | Facility: CLINIC | Age: 34
End: 2019-10-07
Payer: MEDICAID

## 2019-10-07 ENCOUNTER — TRANSCRIPTION ENCOUNTER (OUTPATIENT)
Age: 34
End: 2019-10-07

## 2019-10-07 VITALS
HEIGHT: 67 IN | WEIGHT: 160 LBS | BODY MASS INDEX: 25.11 KG/M2 | DIASTOLIC BLOOD PRESSURE: 75 MMHG | SYSTOLIC BLOOD PRESSURE: 111 MMHG

## 2019-10-07 DIAGNOSIS — Z34.00 ENCOUNTER FOR SUPERVISION OF NORMAL FIRST PREGNANCY, UNSPECIFIED TRIMESTER: ICD-10-CM

## 2019-10-07 LAB
APPEARANCE UR: CLEAR — SIGNIFICANT CHANGE UP
BILIRUB UR-MCNC: NEGATIVE — SIGNIFICANT CHANGE UP
COLOR SPEC: SIGNIFICANT CHANGE UP
DIFF PNL FLD: NEGATIVE — SIGNIFICANT CHANGE UP
GLUCOSE UR QL: NEGATIVE — SIGNIFICANT CHANGE UP
KETONES UR-MCNC: NEGATIVE — SIGNIFICANT CHANGE UP
LEUKOCYTE ESTERASE UR-ACNC: NEGATIVE — SIGNIFICANT CHANGE UP
NITRITE UR-MCNC: NEGATIVE — SIGNIFICANT CHANGE UP
PH UR: 7.5 — SIGNIFICANT CHANGE UP (ref 5–8)
PROT UR-MCNC: SIGNIFICANT CHANGE UP
SP GR SPEC: 1.01 — SIGNIFICANT CHANGE UP (ref 1.01–1.02)
UROBILINOGEN FLD QL: SIGNIFICANT CHANGE UP

## 2019-10-07 PROCEDURE — 81003 URINALYSIS AUTO W/O SCOPE: CPT

## 2019-10-07 PROCEDURE — 99213 OFFICE O/P EST LOW 20 MIN: CPT

## 2019-10-07 PROCEDURE — G0463: CPT

## 2019-10-07 PROCEDURE — 87086 URINE CULTURE/COLONY COUNT: CPT

## 2019-10-09 DIAGNOSIS — D57.3 SICKLE-CELL TRAIT: ICD-10-CM

## 2019-10-09 DIAGNOSIS — Z34.92 ENCOUNTER FOR SUPERVISION OF NORMAL PREGNANCY, UNSPECIFIED, SECOND TRIMESTER: ICD-10-CM

## 2019-10-09 DIAGNOSIS — Z3A.32 32 WEEKS GESTATION OF PREGNANCY: ICD-10-CM

## 2019-10-09 LAB
CULTURE RESULTS: SIGNIFICANT CHANGE UP
SPECIMEN SOURCE: SIGNIFICANT CHANGE UP

## 2019-10-15 ENCOUNTER — APPOINTMENT (OUTPATIENT)
Dept: ANTEPARTUM | Facility: CLINIC | Age: 34
End: 2019-10-15
Payer: MEDICAID

## 2019-10-15 ENCOUNTER — ASOB RESULT (OUTPATIENT)
Age: 34
End: 2019-10-15

## 2019-10-15 PROCEDURE — 76816 OB US FOLLOW-UP PER FETUS: CPT

## 2019-10-15 PROCEDURE — 76819 FETAL BIOPHYS PROFIL W/O NST: CPT

## 2019-10-21 ENCOUNTER — APPOINTMENT (OUTPATIENT)
Dept: OBGYN | Facility: CLINIC | Age: 34
End: 2019-10-21
Payer: MEDICAID

## 2019-10-21 ENCOUNTER — NON-APPOINTMENT (OUTPATIENT)
Age: 34
End: 2019-10-21

## 2019-10-21 ENCOUNTER — OUTPATIENT (OUTPATIENT)
Dept: OUTPATIENT SERVICES | Facility: HOSPITAL | Age: 34
LOS: 1 days | End: 2019-10-21
Payer: MEDICAID

## 2019-10-21 VITALS
WEIGHT: 162.8 LBS | HEIGHT: 67 IN | SYSTOLIC BLOOD PRESSURE: 114 MMHG | HEART RATE: 80 BPM | RESPIRATION RATE: 18 BRPM | OXYGEN SATURATION: 100 % | BODY MASS INDEX: 25.55 KG/M2 | DIASTOLIC BLOOD PRESSURE: 72 MMHG | TEMPERATURE: 97.9 F

## 2019-10-21 DIAGNOSIS — Z34.00 ENCOUNTER FOR SUPERVISION OF NORMAL FIRST PREGNANCY, UNSPECIFIED TRIMESTER: ICD-10-CM

## 2019-10-21 DIAGNOSIS — Z34.92 ENCOUNTER FOR SUPERVISION OF NORMAL PREGNANCY, UNSPECIFIED, SECOND TRIMESTER: ICD-10-CM

## 2019-10-21 LAB
APPEARANCE UR: CLEAR — SIGNIFICANT CHANGE UP
BASOPHILS # BLD AUTO: 0.03 K/UL — SIGNIFICANT CHANGE UP (ref 0–0.2)
BASOPHILS NFR BLD AUTO: 0.3 % — SIGNIFICANT CHANGE UP (ref 0–2)
BILIRUB UR-MCNC: NEGATIVE — SIGNIFICANT CHANGE UP
COLOR SPEC: SIGNIFICANT CHANGE UP
DIFF PNL FLD: NEGATIVE — SIGNIFICANT CHANGE UP
EOSINOPHIL # BLD AUTO: 0.38 K/UL — SIGNIFICANT CHANGE UP (ref 0–0.5)
EOSINOPHIL NFR BLD AUTO: 3.7 % — SIGNIFICANT CHANGE UP (ref 0–6)
GLUCOSE 1H P GLC SERPL-MCNC: 79 MG/DL — SIGNIFICANT CHANGE UP (ref 70–199)
GLUCOSE UR QL: NEGATIVE — SIGNIFICANT CHANGE UP
HCT VFR BLD CALC: 31.3 % — LOW (ref 34.5–45)
HGB BLD-MCNC: 9.7 G/DL — LOW (ref 11.5–15.5)
IMM GRANULOCYTES NFR BLD AUTO: 1.5 % — SIGNIFICANT CHANGE UP (ref 0–1.5)
KETONES UR-MCNC: NEGATIVE — SIGNIFICANT CHANGE UP
LEUKOCYTE ESTERASE UR-ACNC: NEGATIVE — SIGNIFICANT CHANGE UP
LYMPHOCYTES # BLD AUTO: 1.65 K/UL — SIGNIFICANT CHANGE UP (ref 1–3.3)
LYMPHOCYTES # BLD AUTO: 16.1 % — SIGNIFICANT CHANGE UP (ref 13–44)
MCHC RBC-ENTMCNC: 26.2 PG — LOW (ref 27–34)
MCHC RBC-ENTMCNC: 31 GM/DL — LOW (ref 32–36)
MCV RBC AUTO: 84.6 FL — SIGNIFICANT CHANGE UP (ref 80–100)
MONOCYTES # BLD AUTO: 0.85 K/UL — SIGNIFICANT CHANGE UP (ref 0–0.9)
MONOCYTES NFR BLD AUTO: 8.3 % — SIGNIFICANT CHANGE UP (ref 2–14)
NEUTROPHILS # BLD AUTO: 7.18 K/UL — SIGNIFICANT CHANGE UP (ref 1.8–7.4)
NEUTROPHILS NFR BLD AUTO: 70.1 % — SIGNIFICANT CHANGE UP (ref 43–77)
NITRITE UR-MCNC: NEGATIVE — SIGNIFICANT CHANGE UP
PH UR: 6 — SIGNIFICANT CHANGE UP (ref 5–8)
PLATELET # BLD AUTO: 199 K/UL — SIGNIFICANT CHANGE UP (ref 150–400)
PROT UR-MCNC: NEGATIVE — SIGNIFICANT CHANGE UP
RBC # BLD: 3.7 M/UL — LOW (ref 3.8–5.2)
RBC # FLD: 15.4 % — HIGH (ref 10.3–14.5)
SP GR SPEC: 1.01 — SIGNIFICANT CHANGE UP (ref 1.01–1.02)
UROBILINOGEN FLD QL: SIGNIFICANT CHANGE UP
WBC # BLD: 10.24 K/UL — SIGNIFICANT CHANGE UP (ref 3.8–10.5)
WBC # FLD AUTO: 10.24 K/UL — SIGNIFICANT CHANGE UP (ref 3.8–10.5)

## 2019-10-21 PROCEDURE — 86780 TREPONEMA PALLIDUM: CPT

## 2019-10-21 PROCEDURE — 82950 GLUCOSE TEST: CPT

## 2019-10-21 PROCEDURE — 87086 URINE CULTURE/COLONY COUNT: CPT

## 2019-10-21 PROCEDURE — 85027 COMPLETE CBC AUTOMATED: CPT

## 2019-10-21 PROCEDURE — 99213 OFFICE O/P EST LOW 20 MIN: CPT

## 2019-10-21 PROCEDURE — G0463: CPT

## 2019-10-21 PROCEDURE — 81003 URINALYSIS AUTO W/O SCOPE: CPT

## 2019-10-22 DIAGNOSIS — D57.3 SICKLE-CELL TRAIT: ICD-10-CM

## 2019-10-22 DIAGNOSIS — Z3A.34 34 WEEKS GESTATION OF PREGNANCY: ICD-10-CM

## 2019-10-22 DIAGNOSIS — Z34.93 ENCOUNTER FOR SUPERVISION OF NORMAL PREGNANCY, UNSPECIFIED, THIRD TRIMESTER: ICD-10-CM

## 2019-10-22 LAB
CULTURE RESULTS: SIGNIFICANT CHANGE UP
SPECIMEN SOURCE: SIGNIFICANT CHANGE UP
T PALLIDUM AB TITR SER: NEGATIVE — SIGNIFICANT CHANGE UP

## 2019-10-25 ENCOUNTER — CHART COPY (OUTPATIENT)
Age: 34
End: 2019-10-25

## 2019-11-05 ENCOUNTER — OUTPATIENT (OUTPATIENT)
Dept: OUTPATIENT SERVICES | Facility: HOSPITAL | Age: 34
LOS: 1 days | End: 2019-11-05
Payer: MEDICAID

## 2019-11-05 ENCOUNTER — ASOB RESULT (OUTPATIENT)
Age: 34
End: 2019-11-05

## 2019-11-05 ENCOUNTER — APPOINTMENT (OUTPATIENT)
Dept: ANTEPARTUM | Facility: CLINIC | Age: 34
End: 2019-11-05
Payer: MEDICAID

## 2019-11-05 ENCOUNTER — NON-APPOINTMENT (OUTPATIENT)
Age: 34
End: 2019-11-05

## 2019-11-05 ENCOUNTER — APPOINTMENT (OUTPATIENT)
Dept: OBGYN | Facility: CLINIC | Age: 34
End: 2019-11-05
Payer: MEDICAID

## 2019-11-05 VITALS
BODY MASS INDEX: 25.58 KG/M2 | HEIGHT: 67 IN | DIASTOLIC BLOOD PRESSURE: 74 MMHG | WEIGHT: 163 LBS | SYSTOLIC BLOOD PRESSURE: 121 MMHG

## 2019-11-05 DIAGNOSIS — R82.90 UNSPECIFIED ABNORMAL FINDINGS IN URINE: ICD-10-CM

## 2019-11-05 DIAGNOSIS — Z34.00 ENCOUNTER FOR SUPERVISION OF NORMAL FIRST PREGNANCY, UNSPECIFIED TRIMESTER: ICD-10-CM

## 2019-11-05 LAB
ALBUMIN SERPL ELPH-MCNC: 3.5 G/DL — SIGNIFICANT CHANGE UP (ref 3.3–5)
ALP SERPL-CCNC: 193 U/L — HIGH (ref 40–120)
ALT FLD-CCNC: 11 U/L — SIGNIFICANT CHANGE UP (ref 10–45)
ANION GAP SERPL CALC-SCNC: 10 MMOL/L — SIGNIFICANT CHANGE UP (ref 5–17)
APPEARANCE UR: ABNORMAL
APTT BLD: 30.1 SEC — SIGNIFICANT CHANGE UP (ref 27.5–36.3)
AST SERPL-CCNC: 20 U/L — SIGNIFICANT CHANGE UP (ref 10–40)
BACTERIA # UR AUTO: ABNORMAL
BASOPHILS # BLD AUTO: 0.03 K/UL — SIGNIFICANT CHANGE UP (ref 0–0.2)
BASOPHILS NFR BLD AUTO: 0.3 % — SIGNIFICANT CHANGE UP (ref 0–2)
BILIRUB DIRECT SERPL-MCNC: 0 MG/DL — SIGNIFICANT CHANGE UP (ref 0–0.2)
BILIRUB INDIRECT FLD-MCNC: 0.1 MG/DL — LOW (ref 0.2–1.2)
BILIRUB SERPL-MCNC: <0.2 MG/DL — SIGNIFICANT CHANGE UP (ref 0.2–1.2)
BILIRUB SERPL-MCNC: <0.2 MG/DL — SIGNIFICANT CHANGE UP (ref 0.2–1.2)
BILIRUB UR-MCNC: NEGATIVE — SIGNIFICANT CHANGE UP
BUN SERPL-MCNC: 5 MG/DL — LOW (ref 7–23)
C TRACH RRNA SPEC QL NAA+PROBE: SIGNIFICANT CHANGE UP
CALCIUM SERPL-MCNC: 8.9 MG/DL — SIGNIFICANT CHANGE UP (ref 8.4–10.5)
CHLORIDE SERPL-SCNC: 106 MMOL/L — SIGNIFICANT CHANGE UP (ref 96–108)
CO2 SERPL-SCNC: 20 MMOL/L — LOW (ref 22–31)
COLOR SPEC: SIGNIFICANT CHANGE UP
CREAT ?TM UR-MCNC: 104 MG/DL — SIGNIFICANT CHANGE UP
CREAT SERPL-MCNC: 0.62 MG/DL — SIGNIFICANT CHANGE UP (ref 0.5–1.3)
DIFF PNL FLD: NEGATIVE — SIGNIFICANT CHANGE UP
EOSINOPHIL # BLD AUTO: 0.18 K/UL — SIGNIFICANT CHANGE UP (ref 0–0.5)
EOSINOPHIL NFR BLD AUTO: 1.9 % — SIGNIFICANT CHANGE UP (ref 0–6)
EPI CELLS # UR: 3 /HPF — SIGNIFICANT CHANGE UP (ref 0–5)
FIBRINOGEN PPP-MCNC: 694 MG/DL — HIGH (ref 320–500)
GLUCOSE SERPL-MCNC: 73 MG/DL — SIGNIFICANT CHANGE UP (ref 70–99)
GLUCOSE UR QL: NEGATIVE — SIGNIFICANT CHANGE UP
HCT VFR BLD CALC: 32.2 % — LOW (ref 34.5–45)
HCV AB S/CO SERPL IA: 0.18 S/CO — SIGNIFICANT CHANGE UP (ref 0–0.99)
HCV AB SERPL-IMP: SIGNIFICANT CHANGE UP
HGB BLD-MCNC: 10 G/DL — LOW (ref 11.5–15.5)
HYALINE CASTS # UR AUTO: 0 /LPF — SIGNIFICANT CHANGE UP (ref 0–7)
IMM GRANULOCYTES NFR BLD AUTO: 1.7 % — HIGH (ref 0–1.5)
INR BLD: 0.95 RATIO — SIGNIFICANT CHANGE UP (ref 0.88–1.16)
KETONES UR-MCNC: NEGATIVE — SIGNIFICANT CHANGE UP
LEUKOCYTE ESTERASE UR-ACNC: NEGATIVE — SIGNIFICANT CHANGE UP
LYMPHOCYTES # BLD AUTO: 1.95 K/UL — SIGNIFICANT CHANGE UP (ref 1–3.3)
LYMPHOCYTES # BLD AUTO: 20.7 % — SIGNIFICANT CHANGE UP (ref 13–44)
MCHC RBC-ENTMCNC: 25.8 PG — LOW (ref 27–34)
MCHC RBC-ENTMCNC: 31.1 GM/DL — LOW (ref 32–36)
MCV RBC AUTO: 83.2 FL — SIGNIFICANT CHANGE UP (ref 80–100)
MONOCYTES # BLD AUTO: 0.65 K/UL — SIGNIFICANT CHANGE UP (ref 0–0.9)
MONOCYTES NFR BLD AUTO: 6.9 % — SIGNIFICANT CHANGE UP (ref 2–14)
N GONORRHOEA RRNA SPEC QL NAA+PROBE: SIGNIFICANT CHANGE UP
NEUTROPHILS # BLD AUTO: 6.43 K/UL — SIGNIFICANT CHANGE UP (ref 1.8–7.4)
NEUTROPHILS NFR BLD AUTO: 68.5 % — SIGNIFICANT CHANGE UP (ref 43–77)
NITRITE UR-MCNC: NEGATIVE — SIGNIFICANT CHANGE UP
PH UR: 6.5 — SIGNIFICANT CHANGE UP (ref 5–8)
PLATELET # BLD AUTO: 194 K/UL — SIGNIFICANT CHANGE UP (ref 150–400)
POTASSIUM SERPL-MCNC: 3.8 MMOL/L — SIGNIFICANT CHANGE UP (ref 3.5–5.3)
POTASSIUM SERPL-SCNC: 3.8 MMOL/L — SIGNIFICANT CHANGE UP (ref 3.5–5.3)
PROT ?TM UR-MCNC: 12 MG/DL — SIGNIFICANT CHANGE UP (ref 0–12)
PROT SERPL-MCNC: 6.9 G/DL — SIGNIFICANT CHANGE UP (ref 6–8.3)
PROT UR-MCNC: SIGNIFICANT CHANGE UP
PROT/CREAT UR-RTO: 0.1 RATIO — SIGNIFICANT CHANGE UP (ref 0–0.2)
PROTHROM AB SERPL-ACNC: 10.9 SEC — SIGNIFICANT CHANGE UP (ref 10–13.1)
RBC # BLD: 3.87 M/UL — SIGNIFICANT CHANGE UP (ref 3.8–5.2)
RBC # FLD: 17 % — HIGH (ref 10.3–14.5)
RBC CASTS # UR COMP ASSIST: 0 /HPF — SIGNIFICANT CHANGE UP (ref 0–4)
SODIUM SERPL-SCNC: 136 MMOL/L — SIGNIFICANT CHANGE UP (ref 135–145)
SP GR SPEC: 1.01 — SIGNIFICANT CHANGE UP (ref 1.01–1.02)
SPECIMEN SOURCE: SIGNIFICANT CHANGE UP
T PALLIDUM AB TITR SER: NEGATIVE — SIGNIFICANT CHANGE UP
URATE SERPL-MCNC: 3.3 MG/DL — SIGNIFICANT CHANGE UP (ref 2.5–7)
UROBILINOGEN FLD QL: SIGNIFICANT CHANGE UP
WBC # BLD: 9.4 K/UL — SIGNIFICANT CHANGE UP (ref 3.8–10.5)
WBC # FLD AUTO: 9.4 K/UL — SIGNIFICANT CHANGE UP (ref 3.8–10.5)
WBC UR QL: 2 /HPF — SIGNIFICANT CHANGE UP (ref 0–5)

## 2019-11-05 PROCEDURE — 84550 ASSAY OF BLOOD/URIC ACID: CPT

## 2019-11-05 PROCEDURE — 87389 HIV-1 AG W/HIV-1&-2 AB AG IA: CPT

## 2019-11-05 PROCEDURE — 81001 URINALYSIS AUTO W/SCOPE: CPT

## 2019-11-05 PROCEDURE — 76816 OB US FOLLOW-UP PER FETUS: CPT

## 2019-11-05 PROCEDURE — 85384 FIBRINOGEN ACTIVITY: CPT

## 2019-11-05 PROCEDURE — 82247 BILIRUBIN TOTAL: CPT

## 2019-11-05 PROCEDURE — 86780 TREPONEMA PALLIDUM: CPT

## 2019-11-05 PROCEDURE — 87591 N.GONORRHOEAE DNA AMP PROB: CPT

## 2019-11-05 PROCEDURE — 99213 OFFICE O/P EST LOW 20 MIN: CPT

## 2019-11-05 PROCEDURE — 86803 HEPATITIS C AB TEST: CPT

## 2019-11-05 PROCEDURE — 81003 URINALYSIS AUTO W/O SCOPE: CPT

## 2019-11-05 PROCEDURE — 82570 ASSAY OF URINE CREATININE: CPT

## 2019-11-05 PROCEDURE — 85610 PROTHROMBIN TIME: CPT

## 2019-11-05 PROCEDURE — 85027 COMPLETE CBC AUTOMATED: CPT

## 2019-11-05 PROCEDURE — 87491 CHLMYD TRACH DNA AMP PROBE: CPT

## 2019-11-05 PROCEDURE — 87086 URINE CULTURE/COLONY COUNT: CPT

## 2019-11-05 PROCEDURE — 82248 BILIRUBIN DIRECT: CPT

## 2019-11-05 PROCEDURE — 84156 ASSAY OF PROTEIN URINE: CPT

## 2019-11-05 PROCEDURE — G0463: CPT

## 2019-11-05 PROCEDURE — 80053 COMPREHEN METABOLIC PANEL: CPT

## 2019-11-05 PROCEDURE — 85730 THROMBOPLASTIN TIME PARTIAL: CPT

## 2019-11-05 PROCEDURE — 87653 STREP B DNA AMP PROBE: CPT

## 2019-11-05 RX ORDER — CEFDINIR 300 MG/1
300 CAPSULE ORAL
Qty: 14 | Refills: 0 | Status: DISCONTINUED | COMMUNITY
Start: 2019-10-24 | End: 2019-11-05

## 2019-11-06 LAB
CULTURE RESULTS: SIGNIFICANT CHANGE UP
GROUP B BETA STREP DNA (PCR): SIGNIFICANT CHANGE UP
GROUP B BETA STREP INTERPRETATION: SIGNIFICANT CHANGE UP
HIV 1+2 AB+HIV1 P24 AG SERPL QL IA: SIGNIFICANT CHANGE UP
SOURCE GROUP B STREP: SIGNIFICANT CHANGE UP
SPECIMEN SOURCE: SIGNIFICANT CHANGE UP

## 2019-11-07 DIAGNOSIS — O09.299 SUPERVISION OF PREGNANCY WITH OTHER POOR REPRODUCTIVE OR OBSTETRIC HISTORY, UNSPECIFIED TRIMESTER: ICD-10-CM

## 2019-11-07 DIAGNOSIS — D57.3 SICKLE-CELL TRAIT: ICD-10-CM

## 2019-11-07 DIAGNOSIS — Z3A.37 37 WEEKS GESTATION OF PREGNANCY: ICD-10-CM

## 2019-11-12 ENCOUNTER — NON-APPOINTMENT (OUTPATIENT)
Age: 34
End: 2019-11-12

## 2019-11-12 ENCOUNTER — OUTPATIENT (OUTPATIENT)
Dept: OUTPATIENT SERVICES | Facility: HOSPITAL | Age: 34
LOS: 1 days | End: 2019-11-12
Payer: MEDICAID

## 2019-11-12 ENCOUNTER — APPOINTMENT (OUTPATIENT)
Dept: OBGYN | Facility: CLINIC | Age: 34
End: 2019-11-12
Payer: MEDICAID

## 2019-11-12 VITALS
HEIGHT: 67 IN | DIASTOLIC BLOOD PRESSURE: 77 MMHG | WEIGHT: 162 LBS | SYSTOLIC BLOOD PRESSURE: 110 MMHG | BODY MASS INDEX: 25.43 KG/M2

## 2019-11-12 DIAGNOSIS — Z34.00 ENCOUNTER FOR SUPERVISION OF NORMAL FIRST PREGNANCY, UNSPECIFIED TRIMESTER: ICD-10-CM

## 2019-11-12 PROCEDURE — G0463: CPT

## 2019-11-12 PROCEDURE — 81003 URINALYSIS AUTO W/O SCOPE: CPT

## 2019-11-12 PROCEDURE — 99213 OFFICE O/P EST LOW 20 MIN: CPT | Mod: 25

## 2019-11-12 PROCEDURE — G0008: CPT

## 2019-11-12 PROCEDURE — 96372 THER/PROPH/DIAG INJ SC/IM: CPT

## 2019-11-12 PROCEDURE — G0008: CPT | Mod: NC

## 2019-11-13 DIAGNOSIS — O09.299 SUPERVISION OF PREGNANCY WITH OTHER POOR REPRODUCTIVE OR OBSTETRIC HISTORY, UNSPECIFIED TRIMESTER: ICD-10-CM

## 2019-11-13 DIAGNOSIS — D57.3 SICKLE-CELL TRAIT: ICD-10-CM

## 2019-11-13 DIAGNOSIS — Z3A.38 38 WEEKS GESTATION OF PREGNANCY: ICD-10-CM

## 2019-11-13 DIAGNOSIS — Z23 ENCOUNTER FOR IMMUNIZATION: ICD-10-CM

## 2019-11-22 ENCOUNTER — NON-APPOINTMENT (OUTPATIENT)
Age: 34
End: 2019-11-22

## 2019-11-22 ENCOUNTER — OUTPATIENT (OUTPATIENT)
Dept: OUTPATIENT SERVICES | Facility: HOSPITAL | Age: 34
LOS: 1 days | End: 2019-11-22
Payer: MEDICAID

## 2019-11-22 ENCOUNTER — APPOINTMENT (OUTPATIENT)
Dept: OBGYN | Facility: CLINIC | Age: 34
End: 2019-11-22
Payer: MEDICAID

## 2019-11-22 VITALS
DIASTOLIC BLOOD PRESSURE: 81 MMHG | SYSTOLIC BLOOD PRESSURE: 123 MMHG | WEIGHT: 166 LBS | HEIGHT: 67 IN | BODY MASS INDEX: 26.06 KG/M2

## 2019-11-22 DIAGNOSIS — Z34.00 ENCOUNTER FOR SUPERVISION OF NORMAL FIRST PREGNANCY, UNSPECIFIED TRIMESTER: ICD-10-CM

## 2019-11-22 DIAGNOSIS — D57.3 SICKLE-CELL TRAIT: ICD-10-CM

## 2019-11-22 DIAGNOSIS — Z92.29 PERSONAL HISTORY OF OTHER DRUG THERAPY: ICD-10-CM

## 2019-11-22 DIAGNOSIS — Z34.93 ENCOUNTER FOR SUPERVISION OF NORMAL PREGNANCY, UNSPECIFIED, THIRD TRIMESTER: ICD-10-CM

## 2019-11-22 DIAGNOSIS — O09.299 SUPERVISION OF PREGNANCY WITH OTHER POOR REPRODUCTIVE OR OBSTETRIC HISTORY, UNSPECIFIED TRIMESTER: ICD-10-CM

## 2019-11-22 PROCEDURE — G0463: CPT

## 2019-11-22 PROCEDURE — 81003 URINALYSIS AUTO W/O SCOPE: CPT

## 2019-11-22 PROCEDURE — 99213 OFFICE O/P EST LOW 20 MIN: CPT

## 2019-11-26 ENCOUNTER — OUTPATIENT (OUTPATIENT)
Dept: OUTPATIENT SERVICES | Facility: HOSPITAL | Age: 34
LOS: 1 days | End: 2019-11-26
Payer: MEDICAID

## 2019-11-26 DIAGNOSIS — O26.899 OTHER SPECIFIED PREGNANCY RELATED CONDITIONS, UNSPECIFIED TRIMESTER: ICD-10-CM

## 2019-11-26 DIAGNOSIS — Z3A.00 WEEKS OF GESTATION OF PREGNANCY NOT SPECIFIED: ICD-10-CM

## 2019-11-26 PROCEDURE — 59025 FETAL NON-STRESS TEST: CPT

## 2019-11-26 PROCEDURE — 76819 FETAL BIOPHYS PROFIL W/O NST: CPT | Mod: 26

## 2019-11-26 PROCEDURE — 76815 OB US LIMITED FETUS(S): CPT | Mod: 26

## 2019-11-26 PROCEDURE — 76815 OB US LIMITED FETUS(S): CPT

## 2019-11-26 PROCEDURE — 76819 FETAL BIOPHYS PROFIL W/O NST: CPT

## 2019-11-26 PROCEDURE — 59025 FETAL NON-STRESS TEST: CPT | Mod: 26

## 2019-11-26 PROCEDURE — G0463: CPT

## 2019-11-28 ENCOUNTER — INPATIENT (INPATIENT)
Facility: HOSPITAL | Age: 34
LOS: 1 days | Discharge: HOME | End: 2019-11-30
Attending: SPECIALIST | Admitting: SPECIALIST
Payer: MEDICAID

## 2019-11-28 VITALS
DIASTOLIC BLOOD PRESSURE: 68 MMHG | RESPIRATION RATE: 18 BRPM | HEART RATE: 101 BPM | TEMPERATURE: 99 F | SYSTOLIC BLOOD PRESSURE: 121 MMHG

## 2019-11-28 DIAGNOSIS — O26.899 OTHER SPECIFIED PREGNANCY RELATED CONDITIONS, UNSPECIFIED TRIMESTER: ICD-10-CM

## 2019-11-28 DIAGNOSIS — Z90.89 ACQUIRED ABSENCE OF OTHER ORGANS: Chronic | ICD-10-CM

## 2019-11-28 DIAGNOSIS — Z3A.00 WEEKS OF GESTATION OF PREGNANCY NOT SPECIFIED: ICD-10-CM

## 2019-11-28 LAB
BASOPHILS # BLD AUTO: 0.03 K/UL — SIGNIFICANT CHANGE UP (ref 0–0.2)
BASOPHILS NFR BLD AUTO: 0.3 % — SIGNIFICANT CHANGE UP (ref 0–2)
BLD GP AB SCN SERPL QL: NEGATIVE — SIGNIFICANT CHANGE UP
EOSINOPHIL # BLD AUTO: 0.32 K/UL — SIGNIFICANT CHANGE UP (ref 0–0.5)
EOSINOPHIL NFR BLD AUTO: 3.1 % — SIGNIFICANT CHANGE UP (ref 0–6)
HBV SURFACE AG SER-ACNC: NEGATIVE — SIGNIFICANT CHANGE UP
HCT VFR BLD CALC: 36 % — SIGNIFICANT CHANGE UP (ref 34.5–45)
HGB BLD-MCNC: 11.4 G/DL — LOW (ref 11.5–15.5)
IMM GRANULOCYTES NFR BLD AUTO: 0.9 % — SIGNIFICANT CHANGE UP (ref 0–1.5)
LYMPHOCYTES # BLD AUTO: 1.2 K/UL — SIGNIFICANT CHANGE UP (ref 1–3.3)
LYMPHOCYTES # BLD AUTO: 11.7 % — LOW (ref 13–44)
MCHC RBC-ENTMCNC: 25.6 PG — LOW (ref 27–34)
MCHC RBC-ENTMCNC: 31.7 % — LOW (ref 32–36)
MCV RBC AUTO: 80.9 FL — SIGNIFICANT CHANGE UP (ref 80–100)
MONOCYTES # BLD AUTO: 0.88 K/UL — SIGNIFICANT CHANGE UP (ref 0–0.9)
MONOCYTES NFR BLD AUTO: 8.6 % — SIGNIFICANT CHANGE UP (ref 2–14)
NEUTROPHILS # BLD AUTO: 7.72 K/UL — HIGH (ref 1.8–7.4)
NEUTROPHILS NFR BLD AUTO: 75.4 % — SIGNIFICANT CHANGE UP (ref 43–77)
NRBC # FLD: 0 K/UL — SIGNIFICANT CHANGE UP (ref 0–0)
PLATELET # BLD AUTO: 166 K/UL — SIGNIFICANT CHANGE UP (ref 150–400)
PMV BLD: 12.2 FL — SIGNIFICANT CHANGE UP (ref 7–13)
RBC # BLD: 4.45 M/UL — SIGNIFICANT CHANGE UP (ref 3.8–5.2)
RBC # FLD: 17.6 % — HIGH (ref 10.3–14.5)
RH IG SCN BLD-IMP: POSITIVE — SIGNIFICANT CHANGE UP
T PALLIDUM AB TITR SER: NEGATIVE — SIGNIFICANT CHANGE UP
WBC # BLD: 10.24 K/UL — SIGNIFICANT CHANGE UP (ref 3.8–10.5)
WBC # FLD AUTO: 10.24 K/UL — SIGNIFICANT CHANGE UP (ref 3.8–10.5)

## 2019-11-28 PROCEDURE — 59409 OBSTETRICAL CARE: CPT | Mod: U9,UB,GC

## 2019-11-28 RX ORDER — ACETAMINOPHEN 500 MG
975 TABLET ORAL
Refills: 0 | Status: DISCONTINUED | OUTPATIENT
Start: 2019-11-28 | End: 2019-11-30

## 2019-11-28 RX ORDER — SODIUM CHLORIDE 9 MG/ML
500 INJECTION, SOLUTION INTRAVENOUS ONCE
Refills: 0 | Status: DISCONTINUED | OUTPATIENT
Start: 2019-11-28 | End: 2019-11-29

## 2019-11-28 RX ORDER — AER TRAVELER 0.5 G/1
1 SOLUTION RECTAL; TOPICAL EVERY 4 HOURS
Refills: 0 | Status: DISCONTINUED | OUTPATIENT
Start: 2019-11-28 | End: 2019-11-30

## 2019-11-28 RX ORDER — OXYCODONE HYDROCHLORIDE 5 MG/1
5 TABLET ORAL ONCE
Refills: 0 | Status: DISCONTINUED | OUTPATIENT
Start: 2019-11-28 | End: 2019-11-30

## 2019-11-28 RX ORDER — DIPHENHYDRAMINE HCL 50 MG
25 CAPSULE ORAL EVERY 6 HOURS
Refills: 0 | Status: DISCONTINUED | OUTPATIENT
Start: 2019-11-28 | End: 2019-11-30

## 2019-11-28 RX ORDER — SODIUM CHLORIDE 9 MG/ML
3 INJECTION INTRAMUSCULAR; INTRAVENOUS; SUBCUTANEOUS EVERY 8 HOURS
Refills: 0 | Status: DISCONTINUED | OUTPATIENT
Start: 2019-11-28 | End: 2019-11-30

## 2019-11-28 RX ORDER — SIMETHICONE 80 MG/1
80 TABLET, CHEWABLE ORAL EVERY 4 HOURS
Refills: 0 | Status: DISCONTINUED | OUTPATIENT
Start: 2019-11-28 | End: 2019-11-30

## 2019-11-28 RX ORDER — GLYCERIN ADULT
1 SUPPOSITORY, RECTAL RECTAL AT BEDTIME
Refills: 0 | Status: DISCONTINUED | OUTPATIENT
Start: 2019-11-28 | End: 2019-11-30

## 2019-11-28 RX ORDER — OXYTOCIN 10 UNIT/ML
333.33 VIAL (ML) INJECTION
Qty: 20 | Refills: 0 | Status: DISCONTINUED | OUTPATIENT
Start: 2019-11-28 | End: 2019-11-30

## 2019-11-28 RX ORDER — SODIUM CHLORIDE 9 MG/ML
1000 INJECTION, SOLUTION INTRAVENOUS
Refills: 0 | Status: DISCONTINUED | OUTPATIENT
Start: 2019-11-28 | End: 2019-11-28

## 2019-11-28 RX ORDER — HYDROCORTISONE 1 %
1 OINTMENT (GRAM) TOPICAL EVERY 6 HOURS
Refills: 0 | Status: DISCONTINUED | OUTPATIENT
Start: 2019-11-28 | End: 2019-11-30

## 2019-11-28 RX ORDER — IBUPROFEN 200 MG
600 TABLET ORAL EVERY 6 HOURS
Refills: 0 | Status: DISCONTINUED | OUTPATIENT
Start: 2019-11-28 | End: 2019-11-30

## 2019-11-28 RX ORDER — LANOLIN
1 OINTMENT (GRAM) TOPICAL EVERY 6 HOURS
Refills: 0 | Status: DISCONTINUED | OUTPATIENT
Start: 2019-11-28 | End: 2019-11-30

## 2019-11-28 RX ORDER — KETOROLAC TROMETHAMINE 30 MG/ML
30 SYRINGE (ML) INJECTION ONCE
Refills: 0 | Status: DISCONTINUED | OUTPATIENT
Start: 2019-11-28 | End: 2019-11-28

## 2019-11-28 RX ORDER — PRAMOXINE HYDROCHLORIDE 150 MG/15G
1 AEROSOL, FOAM RECTAL EVERY 4 HOURS
Refills: 0 | Status: DISCONTINUED | OUTPATIENT
Start: 2019-11-28 | End: 2019-11-30

## 2019-11-28 RX ORDER — OXYCODONE HYDROCHLORIDE 5 MG/1
5 TABLET ORAL
Refills: 0 | Status: DISCONTINUED | OUTPATIENT
Start: 2019-11-28 | End: 2019-11-30

## 2019-11-28 RX ORDER — BENZOCAINE 10 %
1 GEL (GRAM) MUCOUS MEMBRANE EVERY 6 HOURS
Refills: 0 | Status: DISCONTINUED | OUTPATIENT
Start: 2019-11-28 | End: 2019-11-30

## 2019-11-28 RX ORDER — DIBUCAINE 1 %
1 OINTMENT (GRAM) RECTAL EVERY 6 HOURS
Refills: 0 | Status: DISCONTINUED | OUTPATIENT
Start: 2019-11-28 | End: 2019-11-30

## 2019-11-28 RX ORDER — IBUPROFEN 200 MG
600 TABLET ORAL EVERY 6 HOURS
Refills: 0 | Status: COMPLETED | OUTPATIENT
Start: 2019-11-28 | End: 2020-10-26

## 2019-11-28 RX ORDER — MAGNESIUM HYDROXIDE 400 MG/1
30 TABLET, CHEWABLE ORAL
Refills: 0 | Status: DISCONTINUED | OUTPATIENT
Start: 2019-11-28 | End: 2019-11-30

## 2019-11-28 RX ORDER — OXYTOCIN 10 UNIT/ML
333.33 VIAL (ML) INJECTION
Qty: 20 | Refills: 0 | Status: DISCONTINUED | OUTPATIENT
Start: 2019-11-28 | End: 2019-11-28

## 2019-11-28 RX ORDER — TETANUS TOXOID, REDUCED DIPHTHERIA TOXOID AND ACELLULAR PERTUSSIS VACCINE, ADSORBED 5; 2.5; 8; 8; 2.5 [IU]/.5ML; [IU]/.5ML; UG/.5ML; UG/.5ML; UG/.5ML
0.5 SUSPENSION INTRAMUSCULAR ONCE
Refills: 0 | Status: DISCONTINUED | OUTPATIENT
Start: 2019-11-28 | End: 2019-11-30

## 2019-11-28 RX ADMIN — Medication 600 MILLIGRAM(S): at 18:35

## 2019-11-28 RX ADMIN — PRAMOXINE HYDROCHLORIDE 1 APPLICATION(S): 150 AEROSOL, FOAM RECTAL at 21:07

## 2019-11-28 RX ADMIN — Medication 1 APPLICATION(S): at 21:08

## 2019-11-28 RX ADMIN — Medication 30 MILLIGRAM(S): at 12:00

## 2019-11-28 RX ADMIN — SODIUM CHLORIDE 3 MILLILITER(S): 9 INJECTION INTRAMUSCULAR; INTRAVENOUS; SUBCUTANEOUS at 13:45

## 2019-11-28 RX ADMIN — Medication 1000 MILLIUNIT(S)/MIN: at 11:23

## 2019-11-28 RX ADMIN — Medication 975 MILLIGRAM(S): at 21:07

## 2019-11-28 RX ADMIN — Medication 1 SPRAY(S): at 21:07

## 2019-11-28 RX ADMIN — Medication 30 MILLIGRAM(S): at 11:23

## 2019-11-28 RX ADMIN — Medication 600 MILLIGRAM(S): at 19:30

## 2019-11-28 RX ADMIN — SODIUM CHLORIDE 125 MILLILITER(S): 9 INJECTION, SOLUTION INTRAVENOUS at 07:09

## 2019-11-28 RX ADMIN — Medication 1 TABLET(S): at 18:35

## 2019-11-28 RX ADMIN — Medication 975 MILLIGRAM(S): at 21:35

## 2019-11-28 RX ADMIN — SODIUM CHLORIDE 3 MILLILITER(S): 9 INJECTION INTRAMUSCULAR; INTRAVENOUS; SUBCUTANEOUS at 21:56

## 2019-11-28 RX ADMIN — AER TRAVELER 1 APPLICATION(S): 0.5 SOLUTION RECTAL; TOPICAL at 21:07

## 2019-11-28 NOTE — OB PROVIDER TRIAGE NOTE - NSOBPROVIDERNOTE_OBGYN_ALL_OB_FT
Pt. is a 33y/o   EGA 40.2wks. Pt. reports of painful contractions since 1am. Pt. denies LOF and VB.     AP: Sickle cell trait  Medical/surgical: Denies  OBGYN Hx:  6/15/2017   Meds: PNV, ASA  NKDA    Assessment/Plan:  SVE:8.5/80/-2  TAS: Cephalic presentation  GBS: Neg. ()  EFW: 3200gms by leopolds  FHR:145bpm, moderate variability, accels, no decels  Freddie every 4mins     Evidence of labor. Discussed findings with Dr. Tripathi-Agapito   -Admit to L&D  -For epidural  -Expectant management

## 2019-11-28 NOTE — OB RN DELIVERY SUMMARY - NS_SEPSISRSKCALC_OBGYN_ALL_OB_FT
EOS calculated successfully. EOS Risk Factor: 0.5/1000 live births ([facility] national incidence); GA=40w2d; Temp=98.6; ROM=0.367; GBS='Negative'; Antibiotics='No antibiotics or any antibiotics < 2 hrs prior to birth'

## 2019-11-28 NOTE — OB RN TRIAGE NOTE - PMH
(normal spontaneous vaginal delivery)  2017 FTF 6#15  (normal spontaneous vaginal delivery)  2017 FT F 6#15

## 2019-11-28 NOTE — OB PROVIDER H&P - HISTORY OF PRESENT ILLNESS
Pt. is a 33y/o  EGA 40.2wks. Pt. reports of painful contractions since 1am. Pt. denies LOF and VB. Pt. is a 33y/o   EGA 40.2wks. Pt. reports of painful contractions since 1am. Pt. denies LOF and VB.

## 2019-11-28 NOTE — OB PROVIDER H&P - ASSESSMENT
Pt. is a 33y/o   EGA 40.2wks. Receive prenatal care from [facility]. Pt. reports of painful contractions since 1am. Pt. denies LOF and VB.    AP: Denies  Medical Hx: Sickle Cell Trait  Surgical: Tonsillectomy at age 8  OBGYN Hx:  6/15/2017 [facility]  Meds: PNV, ASA  NKDA    Assessment/Plan:  BP: HR:   SVE:8.5/80/-2  TAS: Cephalic presentation  GBS: Neg. (19)  EFW: 3200gms by leopolds  FHR:145bpm, moderate variability, accels, no decels  Freddie every 4mins     Evidence of labor. Discussed findings with Dr. Tripathi-Agapito   -Admit to L&D  -For epidural  -Expectant management Pt. is a 35y/o   EGA 40.2wks. Receive prenatal care from [facility]. Pt. reports of painful contractions since 1am. Pt. denies LOF and VB.    AP: Denies  Medical Hx: Sickle Cell Trait  Surgical: Tonsillectomy at age 8  OBGYN Hx:  6/15/2017 [facility]  Meds: PNV, ASA  NKDA    Assessment/Plan:  BP:126/72, HR:115  SVE:8.5/80/-2, bulging membranes  TAS: Cephalic presentation  GBS: Neg. (19)  EFW: 3200gms by leopolds  FHR:145bpm, moderate variability, accels, no decels  Freddie every 4mins     Evidence of labor. Discussed findings with Dr. Tripathi-Agaptio   -Admit to L&D  -For epidural  -Expectant management

## 2019-11-28 NOTE — OB PROVIDER DELIVERY SUMMARY - NSPROVIDERDELIVERYNOTE_OBGYN_ALL_OB_FT
of liveborn infant, APGARs 9/9 6lb 14 oz. Head, shoulders and torso were delivered easily. The infant was suctioned and handed to patient. Delayed cord clamping was performed and a segment was cut and taken for blood gases. Gentle cord traction and fundal massage resulted in spontaneous delivery of an intact placenta. Pitocin was started, uterine fundus was firm. Visualization of vaginal vault showed a second degree laceration repaired using a 2.0 chromic suture and a left labial laceration repaired using 3.0 chromic suture. Hemostasis was achieved.  Sponge count was correct. Patient washed and stable.     Giancarlo Medina PGY1  of liveborn infant, APGARs 9/9 6lb 14 oz. Head, shoulders and torso were delivered easily. The infant was suctioned and handed to patient. Delayed cord clamping was performed and a segment was cut and taken for blood gases. Gentle cord traction and fundal massage resulted in spontaneous delivery of an intact placenta. Pitocin was started, uterine fundus was firm. Visualization of vaginal vault showed a second degree laceration repaired using a 2.0 chromic suture and a left labial laceration repaired using 3.0 chromic suture. Hemostasis was achieved.  Sponge count was correct. Patient washed and stable.     Giancarlo Medina PGY1    present for vaginal delivery

## 2019-11-29 ENCOUNTER — TRANSCRIPTION ENCOUNTER (OUTPATIENT)
Age: 34
End: 2019-11-29

## 2019-11-29 RX ADMIN — Medication 975 MILLIGRAM(S): at 21:40

## 2019-11-29 RX ADMIN — Medication 600 MILLIGRAM(S): at 17:21

## 2019-11-29 RX ADMIN — Medication 600 MILLIGRAM(S): at 18:09

## 2019-11-29 RX ADMIN — Medication 600 MILLIGRAM(S): at 11:55

## 2019-11-29 RX ADMIN — Medication 1 TABLET(S): at 11:56

## 2019-11-29 RX ADMIN — Medication 600 MILLIGRAM(S): at 12:30

## 2019-11-29 RX ADMIN — Medication 600 MILLIGRAM(S): at 01:05

## 2019-11-29 RX ADMIN — SODIUM CHLORIDE 3 MILLILITER(S): 9 INJECTION INTRAMUSCULAR; INTRAVENOUS; SUBCUTANEOUS at 05:25

## 2019-11-29 RX ADMIN — Medication 975 MILLIGRAM(S): at 05:28

## 2019-11-29 RX ADMIN — Medication 600 MILLIGRAM(S): at 00:39

## 2019-11-29 RX ADMIN — Medication 975 MILLIGRAM(S): at 05:55

## 2019-11-29 RX ADMIN — Medication 975 MILLIGRAM(S): at 21:11

## 2019-11-29 NOTE — DISCHARGE NOTE OB - HOSPITAL COURSE
Patient had uncomplicated, nonsurgical vaginal delivery.  Please see delivery note for details.  During postpartum course patient's vitals were stable, vaginal bleeding appropriate, and pain well controlled.  On day of discharge patient was ambulating, her pain controlled with oral medications, had adequate oral intake, and was voiding freely.  Discharge instructions and precautions were given.  Will return to clinic in 6 weeks for postpartum visit.  Postpartum birth control plan is abstinence.

## 2019-11-29 NOTE — DISCHARGE NOTE OB - CARE PLAN
Principal Discharge DX:	 (normal spontaneous vaginal delivery)  Goal:	recovery  Assessment and plan of treatment:	After discharge, please stay on pelvic rest for 6 weeks, meaning no sexual intercourse, no tampons and no douching.  No driving for 2 weeks as women can loose a lot of blood during delivery and there is a possibility of being lightheaded/fainting.  No lifting objects heavier than baby for two weeks.  Expect to have vaginal bleeding/spotting for up to six weeks.  The bleeding should get lighter and more white/light brown with time.  For bleeding soaking more than a pad an hour or passing clots greater than the size of your fist, come in to the emergency department.    Follow up with your provider in 6 weeks.

## 2019-11-29 NOTE — DISCHARGE NOTE OB - NS OB DC MMR REASON NOT RECEIVED
Discussed with Gwendolyn, lead RN, reports pt may have sutures out during a nurse only appt with RN checking wound before removal by MA/LPN.    Please contact pt's mom to schedule nurse only appt, thanks.   Contraindicated

## 2019-11-29 NOTE — DISCHARGE NOTE OB - PLAN OF CARE
recovery After discharge, please stay on pelvic rest for 6 weeks, meaning no sexual intercourse, no tampons and no douching.  No driving for 2 weeks as women can loose a lot of blood during delivery and there is a possibility of being lightheaded/fainting.  No lifting objects heavier than baby for two weeks.  Expect to have vaginal bleeding/spotting for up to six weeks.  The bleeding should get lighter and more white/light brown with time.  For bleeding soaking more than a pad an hour or passing clots greater than the size of your fist, come in to the emergency department.    Follow up with your provider in 6 weeks.

## 2019-11-29 NOTE — DISCHARGE NOTE OB - PATIENT PORTAL LINK FT
You can access the FollowMyHealth Patient Portal offered by [facility] by registering at the following website: [email or web address]. By joining [facility]’s FollowMyHealth portal, you will also be able to view your health information using other applications (apps) compatible with our system.

## 2019-11-29 NOTE — PROGRESS NOTE ADULT - ATTENDING COMMENTS
Associate Chief of L&D    this patient is unfamiliar to me and I have not met this patient prior to today.    S: Patient is doing well without complaints.      O: Vital Signs Last 24 Hrs  T(C): 36.7 (2019 05:31), Max: 37 (2019 12:09)  T(F): 98.1 (2019 05:31), Max: 98.6 (2019 12:09)  HR: 77 (2019 05:31) (68 - 124)  BP: 108/55 (2019 05:31) (108/55 - 147/70)  BP(mean): --  RR: 18 (2019 05:31) (18 - 18)  SpO2: 98% (2019 05:31) (96% - 100%)    Labs:                        11.4   10.24 )-----------( 166      ( 2019 06:00 )             36.0       Physical Exam:  General: NAD  Abdomen: soft, non-tender, non-distended, +BS Fundus firm   Vaginal: Lochia wnl  Perineum: normal  Extremities: trace    acetaminophen   Tablet .. 975 milliGRAM(s) Oral <User Schedule>  benzocaine 20%/menthol 0.5% Spray 1 Spray(s) Topical every 6 hours PRN  dibucaine 1% Ointment 1 Application(s) Topical every 6 hours PRN  diphenhydrAMINE 25 milliGRAM(s) Oral every 6 hours PRN  diphtheria/tetanus/pertussis (acellular) Vaccine (ADAcel) 0.5 milliLiter(s) IntraMuscular once  glycerin Suppository - Adult 1 Suppository(s) Rectal at bedtime PRN  hydrocortisone 1% Cream 1 Application(s) Topical every 6 hours PRN  ibuprofen  Tablet. 600 milliGRAM(s) Oral every 6 hours  lactated ringers Bolus 500 milliLiter(s) IV Bolus once  lanolin Ointment 1 Application(s) Topical every 6 hours PRN  magnesium hydroxide Suspension 30 milliLiter(s) Oral two times a day PRN  oxyCODONE    IR 5 milliGRAM(s) Oral every 3 hours PRN  oxyCODONE    IR 5 milliGRAM(s) Oral once PRN  oxytocin Infusion 333.333 milliUNIT(s)/Min IV Continuous <Continuous>  pramoxine 1%/zinc 5% Cream 1 Application(s) Topical every 4 hours PRN  prenatal multivitamin 1 Tablet(s) Oral daily  simethicone 80 milliGRAM(s) Chew every 4 hours PRN  sodium chloride 0.9% lock flush 3 milliLiter(s) IV Push every 8 hours  witch hazel Pads 1 Application(s) Topical every 4 hours PRN      A/P:  PPD#1 s/p .  She is in stable condition without complaints.   -Continue routine PP care  -Continue the current pain medication  -Encourage regular diet  -DVT ppx: SCDs only when not ambulating      Shauna Kennedy MD Associate Chief of L&D    this patient is unfamiliar to me and I have not met this patient prior to today.  She was admitted by Dr Blanchard with [facility] and delivered by Dr Akins and Sharri and had a vaginal delivery with 2 degree laceration and left labial repair    S: Patient is doing well without complaints.      O: Vital Signs Last 24 Hrs  T(C): 36.7 (2019 05:31), Max: 37 (2019 12:09)  T(F): 98.1 (2019 05:31), Max: 98.6 (2019 12:09)  HR: 77 (2019 05:31) (68 - 124)  BP: 108/55 (2019 05:31) (108/55 - 147/70)  BP(mean): --  RR: 18 (2019 05:31) (18 - 18)  SpO2: 98% (2019 05:31) (96% - 100%)    Labs:                        11.4   10.24 )-----------( 166      ( 2019 06:00 )             36.0       Physical Exam:  General: NAD  Abdomen: soft, non-tender, non-distended, +BS Fundus firm   Vaginal: Lochia wnl  Perineum: healing  Extremities: trace    acetaminophen   Tablet .. 975 milliGRAM(s) Oral <User Schedule>  benzocaine 20%/menthol 0.5% Spray 1 Spray(s) Topical every 6 hours PRN  dibucaine 1% Ointment 1 Application(s) Topical every 6 hours PRN  diphenhydrAMINE 25 milliGRAM(s) Oral every 6 hours PRN  diphtheria/tetanus/pertussis (acellular) Vaccine (ADAcel) 0.5 milliLiter(s) IntraMuscular once  glycerin Suppository - Adult 1 Suppository(s) Rectal at bedtime PRN  hydrocortisone 1% Cream 1 Application(s) Topical every 6 hours PRN  ibuprofen  Tablet. 600 milliGRAM(s) Oral every 6 hours  lactated ringers Bolus 500 milliLiter(s) IV Bolus once  lanolin Ointment 1 Application(s) Topical every 6 hours PRN  magnesium hydroxide Suspension 30 milliLiter(s) Oral two times a day PRN  oxyCODONE    IR 5 milliGRAM(s) Oral every 3 hours PRN  oxyCODONE    IR 5 milliGRAM(s) Oral once PRN  oxytocin Infusion 333.333 milliUNIT(s)/Min IV Continuous <Continuous>  pramoxine 1%/zinc 5% Cream 1 Application(s) Topical every 4 hours PRN  prenatal multivitamin 1 Tablet(s) Oral daily  simethicone 80 milliGRAM(s) Chew every 4 hours PRN  sodium chloride 0.9% lock flush 3 milliLiter(s) IV Push every 8 hours  witch hazel Pads 1 Application(s) Topical every 4 hours PRN      A/P:  PPD#1 s/p  and repair of 2nd degree laceration and left labia  -Continue the current pain medication  -Encourage regular diet  -DVT ppx: SCDs only when not ambulating      Shaunadorene Kennedy MD

## 2019-11-29 NOTE — PROGRESS NOTE ADULT - SUBJECTIVE AND OBJECTIVE BOX
ANESTHESIA POSTOP CHECK    34y (1985) Female POSTOP DAY 1   T(C): 36.7 (11-29-19 @ 05:31)  HR: 77 (11-29-19 @ 05:31)  BP: 108/55 (11-29-19 @ 05:31) (108/55 - 137/67)  RR: 18 (11-29-19 @ 05:31) (18 - 18)  SpO2: 98% (11-29-19 @ 05:31)      NO APPARENT ANESTHESIA COMPLICATIONS

## 2019-11-29 NOTE — LACTATION INITIAL EVALUATION - LACTATION INTERVENTIONS
initiate skin to skin/initiate hand expression routine/Instructed and assisted with positioning to facilitate deep latch.  Encouraged to feed on cue and follow the feeding log, reviewed. Taught hand expression.  Discussed ways to prevent and treat breast engorgement.  Lactation card given for reinforcement of information given.  Discussed waking techniques and breastfeeding strategies for a more effective feeding .  Reviewed outpatient resources available, warm line, breastfeeding support group.  Encouraged to call for assistance, as needed.

## 2019-11-29 NOTE — DISCHARGE NOTE OB - CARE PROVIDER_API CALL
[facility],   [facility]  Oncology Building  Floor 3  Phone: [phone]  Fax: (   )    -  Follow Up Time:

## 2019-11-29 NOTE — DISCHARGE NOTE OB - PROVIDER TOKENS
FREE:[LAST:[[facility]],PHONE:[phone]],FAX:[(   )    -],ADDRESS:[[facility]  Oncology Building  Floor 3]]

## 2019-11-29 NOTE — PROGRESS NOTE ADULT - SUBJECTIVE AND OBJECTIVE BOX
[facility]    [address] [address]    Phone:  [phone]                                       Kimberley Louis   MRN: [id number]    Department:  [facility]   Date of Visit:  1/2/2018           After Visit Summary Signature Page     I have received my discharge instructions, and my questions have been answered. I have discussed any challenges I see with this plan with the nurse or doctor.    ..........................................................................................................................................  Patient/Patient Representative Signature      ..........................................................................................................................................  Patient Representative Print Name and Relationship to Patient    ..................................................               ................................................  Date                                            Time    ..........................................................................................................................................  Reviewed by Signature/Title    ...................................................              ..............................................  Date                                                            Time           OB Progress Note:  PPD#1    S: 35yo PPD#1 s/p . Patient feels well. Pain is well controlled. She is tolerating a regular diet, voiding spontaneously, and ambulating without difficulty. Denies CP/SOB. Denies lightheadedness/dizziness. Denies N/V.     O:  Vital Signs Last 24 Hrs  T(C): 36.7 (2019 05:31), Max: 37 (2019 12:09)  T(F): 98.1 (2019 05:31), Max: 98.6 (2019 12:09)  HR: 77 (2019 05:31) (68 - 124)  BP: 108/55 (2019 05:31) (108/55 - 147/70)  BP(mean): --  RR: 18 (2019 05:31) (18 - 18)  SpO2: 98% (2019 05:31) (96% - 100%)    Physical Exam:  General: NAD  Abdomen: soft, non-tender, non-distended, fundus firm  Vaginal: Lochia wnl  Extremities: No erythema/edema    MEDICATIONS  (STANDING):  acetaminophen   Tablet .. 975 milliGRAM(s) Oral <User Schedule>  diphtheria/tetanus/pertussis (acellular) Vaccine (ADAcel) 0.5 milliLiter(s) IntraMuscular once  ibuprofen  Tablet. 600 milliGRAM(s) Oral every 6 hours  lactated ringers Bolus 500 milliLiter(s) IV Bolus once  oxytocin Infusion 333.333 milliUNIT(s)/Min (1000 mL/Hr) IV Continuous <Continuous>  prenatal multivitamin 1 Tablet(s) Oral daily  sodium chloride 0.9% lock flush 3 milliLiter(s) IV Push every 8 hours      Labs:  Blood type: O Positive  Rubella IgG: RPR: Negative                          11.4<L>   10.24 >-----------< 166    (  @ 06:00 )             36.0

## 2019-11-29 NOTE — DISCHARGE NOTE OB - MATERIALS PROVIDED
Back To Sleep Handout/  Immunization Record/Shaken Baby Prevention Handout/MMR Vaccination (VIS Pub Date: 2012)/Vaccinations/Birth Certificate Instructions/Tdap Vaccination (VIS Pub Date: 2012)/[facility] [place] Screening Program/Breastfeeding Log

## 2019-11-30 VITALS
HEART RATE: 87 BPM | SYSTOLIC BLOOD PRESSURE: 124 MMHG | OXYGEN SATURATION: 99 % | TEMPERATURE: 98 F | RESPIRATION RATE: 18 BRPM | DIASTOLIC BLOOD PRESSURE: 76 MMHG

## 2019-11-30 RX ORDER — IBUPROFEN 200 MG
1 TABLET ORAL
Qty: 0 | Refills: 0 | DISCHARGE
Start: 2019-11-30

## 2019-11-30 RX ORDER — ACETAMINOPHEN 500 MG
3 TABLET ORAL
Qty: 0 | Refills: 0 | DISCHARGE
Start: 2019-11-30

## 2019-11-30 RX ADMIN — Medication 600 MILLIGRAM(S): at 00:50

## 2019-11-30 RX ADMIN — Medication 600 MILLIGRAM(S): at 06:50

## 2019-11-30 RX ADMIN — Medication 975 MILLIGRAM(S): at 11:52

## 2019-11-30 RX ADMIN — Medication 600 MILLIGRAM(S): at 01:20

## 2019-11-30 RX ADMIN — Medication 975 MILLIGRAM(S): at 11:37

## 2019-11-30 RX ADMIN — Medication 600 MILLIGRAM(S): at 06:21

## 2019-11-30 NOTE — PROGRESS NOTE ADULT - PROBLEM SELECTOR PLAN 1
33yo PPD#1 s/p . Normal EBL.  Patient is stable and doing well post-partum.   - Pain well controlled, continue current pain regimen  - Increase ambulation as tolerated  - Continue regular diet  - AC: ambulation  - BC: abstinence, pt aware of risks of short interval pregnancy    Danielle Schwartz PGY-1  #[id number]
- Pain well controlled, continue current pain regimen  - Increase ambulation, SCDs when not ambulating  - Continue regular diet  - Discharge planning

## 2019-11-30 NOTE — PROGRESS NOTE ADULT - SUBJECTIVE AND OBJECTIVE BOX
OB Progress Note:  PPD#2    S: 33yo PPD#2 s/p . Patient feels well. Pain is well controlled. She is tolerating a regular diet and passing flatus. She is voiding spontaneously, and ambulating without difficulty. Denies CP/SOB. Denies lightheadedness/dizziness. Denies N/V.    O:  Vitals:   Vital Signs Last 24 Hrs  T(C): 36.7 (2019 05:44), Max: 36.7 (2019 05:44)  T(F): 98.1 (2019 05:44), Max: 98.1 (2019 05:44)  HR: 87 (2019 05:44) (87 - 88)  BP: 124/76 (2019 05:44) (123/76 - 124/76)  BP(mean): --  RR: 18 (2019 05:44) (18 - 18)  SpO2: 99% (2019 05:44) (99% - 99%)    MEDICATIONS  (STANDING):  acetaminophen   Tablet .. 975 milliGRAM(s) Oral <User Schedule>  diphtheria/tetanus/pertussis (acellular) Vaccine (ADAcel) 0.5 milliLiter(s) IntraMuscular once  ibuprofen  Tablet. 600 milliGRAM(s) Oral every 6 hours  oxytocin Infusion 333.333 milliUNIT(s)/Min (1000 mL/Hr) IV Continuous <Continuous>  prenatal multivitamin 1 Tablet(s) Oral daily  sodium chloride 0.9% lock flush 3 milliLiter(s) IV Push every 8 hours    MEDICATIONS  (PRN):  benzocaine 20%/menthol 0.5% Spray 1 Spray(s) Topical every 6 hours PRN for Perineal discomfort  dibucaine 1% Ointment 1 Application(s) Topical every 6 hours PRN Perineal discomfort  diphenhydrAMINE 25 milliGRAM(s) Oral every 6 hours PRN Pruritus  glycerin Suppository - Adult 1 Suppository(s) Rectal at bedtime PRN Constipation  hydrocortisone 1% Cream 1 Application(s) Topical every 6 hours PRN Moderate Pain (4-6)  lanolin Ointment 1 Application(s) Topical every 6 hours PRN nipple soreness  magnesium hydroxide Suspension 30 milliLiter(s) Oral two times a day PRN Constipation  oxyCODONE    IR 5 milliGRAM(s) Oral every 3 hours PRN Moderate to Severe Pain (4-10)  oxyCODONE    IR 5 milliGRAM(s) Oral once PRN Moderate to Severe Pain (4-10)  pramoxine 1%/zinc 5% Cream 1 Application(s) Topical every 4 hours PRN Moderate Pain (4-6)  simethicone 80 milliGRAM(s) Chew every 4 hours PRN Gas  witch hazel Pads 1 Application(s) Topical every 4 hours PRN Perineal discomfort      Labs:  Blood type: O Positive  Rubella IgG: RPR: Negative                          11.4<L>   10.24 >-----------< 166    (  @ 06:00 )             36.0        Physical Exam:  General: NAD  Abdomen: soft, non-tender, non-distended, fundus firm  Vaginal: Lochia wnl  Extremities: No erythema/edema

## 2020-01-14 ENCOUNTER — NON-APPOINTMENT (OUTPATIENT)
Age: 35
End: 2020-01-14

## 2020-01-14 ENCOUNTER — OUTPATIENT (OUTPATIENT)
Dept: OUTPATIENT SERVICES | Facility: HOSPITAL | Age: 35
LOS: 1 days | End: 2020-01-14
Payer: MEDICAID

## 2020-01-14 ENCOUNTER — APPOINTMENT (OUTPATIENT)
Dept: OBGYN | Facility: CLINIC | Age: 35
End: 2020-01-14
Payer: MEDICAID

## 2020-01-14 VITALS
OXYGEN SATURATION: 98 % | HEART RATE: 76 BPM | WEIGHT: 150 LBS | SYSTOLIC BLOOD PRESSURE: 105 MMHG | BODY MASS INDEX: 23.54 KG/M2 | HEIGHT: 67 IN | DIASTOLIC BLOOD PRESSURE: 73 MMHG | TEMPERATURE: 97.6 F

## 2020-01-14 DIAGNOSIS — Z90.89 ACQUIRED ABSENCE OF OTHER ORGANS: Chronic | ICD-10-CM

## 2020-01-14 DIAGNOSIS — Z34.93 ENCOUNTER FOR SUPERVISION OF NORMAL PREGNANCY, UNSPECIFIED, THIRD TRIMESTER: ICD-10-CM

## 2020-01-14 DIAGNOSIS — Z34.00 ENCOUNTER FOR SUPERVISION OF NORMAL FIRST PREGNANCY, UNSPECIFIED TRIMESTER: ICD-10-CM

## 2020-01-14 DIAGNOSIS — O09.299 SUPERVISION OF PREGNANCY WITH OTHER POOR REPRODUCTIVE OR OBSTETRIC HISTORY, UNSPECIFIED TRIMESTER: ICD-10-CM

## 2020-01-14 DIAGNOSIS — Z30.09 ENCOUNTER FOR OTHER GENERAL COUNSELING AND ADVICE ON CONTRACEPTION: ICD-10-CM

## 2020-01-14 PROCEDURE — G0463: CPT

## 2020-01-14 PROCEDURE — 99213 OFFICE O/P EST LOW 20 MIN: CPT

## 2020-01-14 RX ORDER — GLUC/MSM/COLGN2/HYAL/ANTIARTH3 375-375-20
240 (27 FE) TABLET ORAL
Qty: 90 | Refills: 3 | Status: COMPLETED | COMMUNITY
Start: 2019-05-09 | End: 2020-01-14

## 2020-01-14 RX ORDER — ASPIRIN 81 MG/1
81 TABLET, CHEWABLE ORAL DAILY
Qty: 30 | Refills: 6 | Status: COMPLETED | COMMUNITY
Start: 2019-05-08 | End: 2020-01-14

## 2020-01-14 RX ORDER — PRENATAL VIT NO.130/IRON/FOLIC 27MG-0.8MG
28-0.8 TABLET ORAL DAILY
Qty: 30 | Refills: 5 | Status: ACTIVE | COMMUNITY
Start: 2019-05-08 | End: 1900-01-01

## 2020-01-14 NOTE — HISTORY OF PRESENT ILLNESS
[Postpartum Follow Up] : postpartum follow up [Last Pap Date: ___] : Last Pap Date: [unfilled] [Delivery Date: ___] : on [unfilled] [] : delivered by vaginal delivery [Male] : Delivery History: baby boy [Wt. ___] : weighing [unfilled] [Rhogam] : Rhogam was not administered [Rubella Vaccine] : Rubella vaccine was not administered [Pertussis Vaccine] : Pertussis vaccine was not administered [BTL] : no tubal ligation [Breastfeeding] : currently nursing [None] : no vaginal bleeding [Back to Normal] : is back to normal in size [Hematoma] : no vaginal hematoma [Abscess Formation] : no vaginal abscess [Healing Well] : is healing well [Infected] : did not appear infected [Dehiscence] : was not dehisced [Cervix Sample Taken] : cervical sample not taken for a Pap smear [Examination Of The Breasts] : breasts are normal [Awake] : awake [Alert] : alert [Acute Distress] : no acute distress [Mass] : no breast mass [Nipple Discharge] : no nipple discharge [Soft] : soft [Tender] : non tender [Distended] : not distended [Oriented x3] : oriented to person, place, and time [Depressed Mood] : not depressed [Flat Affect] : affect not flat [Labia Majora] : labia major [Normal] : clitoris [Cystocele] : no cystocele [Labia Minora] : labia minora [Rectocele] : no rectocele [Discharge] : had no discharge [Pap Obtained] : a Pap smear was not performed [Retroversion] : retroverted [Enlarged ___ wks] : not enlarged [Tenderness] : nontender [Doing Well] : is doing well [No Sign of Infection] : is showing no signs of infection [Excellent Pain Control] : has excellent pain control [de-identified] : Postpartum Exam / Breastfeeding / Contraception counseling [FreeTextEntry8] : (+) Breastfeeding / Unsure contraception options, didn't use anything after birth of first baby [Complications:___] : no complications [BF with Difficulty] : nursing without difficulty [de-identified] : RTO 4 weeks for contraceptive options - pamphlet regarding options reviewed today; Rx PNV renewed today. [Breast Pain] : no breast pain [Back Pain] : no back pain [Abdominal Pain] : no abdominal pain [Chest Pain] : no chest pain [BreastFeeding Problems] : no breastfeeding problems [Cracked Nipples] : no cracked nipples

## 2020-01-15 DIAGNOSIS — Z30.09 ENCOUNTER FOR OTHER GENERAL COUNSELING AND ADVICE ON CONTRACEPTION: ICD-10-CM

## 2022-02-14 NOTE — PROGRESS NOTE ADULT - PROBLEM SELECTOR PLAN 1
Chronic and stable, no fractures continue calcium and vitamin D supplementation   - Continue with po analgesia, encouraged topical vaginal pain relief  - Increase ambulation  - Continue regular diet  - IV lock  - No labs

## 2022-09-15 NOTE — LACTATION INITIAL EVALUATION - AS DELIV COMPLICATIONS OB
nuchal cord Ear Wedge Repair Text: A wedge excision was completed by carrying down an excision through the full thickness of the ear and cartilage with an inward facing Burow's triangle. The wound was then closed in a layered fashion.

## 2023-05-11 NOTE — PATIENT PROFILE OB - NS PRO CONTRA FLU 1
Consent: Written consent obtained. Risks include but not limited to bruising, beading, irregular texture, ulceration, infection, allergic reaction, scar formation, incomplete augmentation, temporary nature, procedural pain. out of season (available sept 1 thru apr 2 only)

## 2024-03-07 NOTE — OB RN PATIENT PROFILE - BREASTFEEDING MOTHER TAUGHT HOW TO HAND EXPRESS THEIR OWN MILK
"THIS IS A MEDICAL STUDENT NOTE AND EXISTS FOR EDUCATIONAL PURPOSES ONLY. PLEASE REFER TO RESIDENT PHYSICIAN NOTE FOR MOST ACCURATE INFORMATION REGARDING PATIENT CARE.     FAMILY MEDICINE PROGRESS NOTE          PATIENT ID:  NAME:  Andrew Wall  MRN:               [id number]  YOB: 1962    Date of Admission: 3/5/2024     Attending: Nam Gill M.d.     Resident: Chey Davis MD     Student: Femi Jensen, Student (PGY-1)    Primary Care Physician:  Jose Luis Juarez M.D.    HPI: Andrew Wall is a 61 y.o. male with PMHx of melanoma with metastasis to the bone and lymph nodes admitted for GLF with pathologic fracture of C2, C6, and C7 vertebra on hospital day 2      SUBJECTIVE:   No acute events overnight. Patient continues to have poor oral intake and was not receiving IVF this morning. Spoke with patient's wife, Liz, this morning who mentions he continues to appear confused. States patient expressed concern about \"cockroaches on the TV\". She did mention that the patient complained of some pain over the coccyx and that he typically uses a donut cushion on that area at home. They would like to return home today if at all possible to ensure Sebastian is comfortable.    OBJECTIVE:  Temp:  [36.7 °C (98 °F)-37.5 °C (99.5 °F)] 37.4 °C (99.4 °F)  Pulse:  [110-124] 124  Resp:  [17-18] 18  BP: ()/(59-68) 98/59  SpO2:  [90 %-94 %] 91 %      Intake/Output Summary (Last 24 hours) at 3/7/2024 0913  Last data filed at 3/7/2024 0446  Gross per 24 hour   Intake --   Output 675 ml   Net -675 ml       PHYSICAL EXAM:  Physical Exam  Constitutional:       Comments: Tired appearing. Lying flat in gurney with c-collar in place.    HENT:      Head: Normocephalic and atraumatic.      Mouth/Throat:      Mouth: Mucous membranes are dry.   Eyes:      Extraocular Movements: Extraocular movements intact.      Conjunctiva/sclera: Conjunctivae normal.      Pupils: Pupils are equal, round, and reactive to light.   Neck: "      Comments: C-collar in place  Cardiovascular:      Rate and Rhythm: Normal rate and regular rhythm.      Pulses: Normal pulses.      Heart sounds: Normal heart sounds.   Pulmonary:      Effort: Pulmonary effort is normal.      Breath sounds: Normal breath sounds.   Abdominal:      General: Abdomen is flat.      Palpations: Abdomen is soft.   Skin:     General: Skin is warm and dry.      Capillary Refill: Capillary refill takes less than 2 seconds.   Neurological:      General: No focal deficit present.      Comments: Slightly confused appearing, requires re instruction with tasks          LABS:  Recent Labs     03/05/24 0945 03/06/24 0708 03/07/24  0022   WBC 6.9 4.8 3.9*   RBC 3.96* 3.00* 3.08*   HEMOGLOBIN 10.3* 7.9* 8.0*   HEMATOCRIT 34.5* 25.6* 26.4*   MCV 87.1 85.3 85.7   MCH 26.0* 26.3* 26.0*   RDW 58.6* 58.9* 59.5*   PLATELETCT 360 237 250   MPV 9.1 9.2 9.0     Recent Labs     03/05/24 0945 03/05/24 2035 03/06/24 0708 03/07/24  0022   SODIUM 131*  --  131* 134*   POTASSIUM 3.8  --  3.6 3.5*   CHLORIDE 96  --  102 103   CO2 23  --  22 19*   BUN 17  --  10 9   CREATININE 0.42*  --  0.24* 0.18*   CALCIUM 7.7*  --  7.0* 7.1*   MAGNESIUM  --  1.5  --   --    PHOSPHORUS  --  2.0*  --   --    ALBUMIN  --   --  2.1*  --      Estimated GFR/CRCL = Estimated Creatinine Clearance: 402.5 mL/min (A) (by C-G formula based on SCr of 0.18 mg/dL (L)).  Recent Labs     03/05/24 0945 03/06/24 0708 03/07/24  0022   GLUCOSE 111* 93 89     Recent Labs     03/05/24 0945 03/06/24 0708   ASTSGOT  --  23   ALTSGPT  --  9   TBILIRUBIN  --  0.4   ALKPHOSPHAT  --  243*   GLOBULIN  --  2.2   INR 1.42*  --              Recent Labs     03/05/24 0945   INR 1.42*   APTT 42.6*         IMAGING:  DX-HIP-BILATERAL-WITH PELVIS-2 VIEWS   Final Result      1.  Chronic metastases of the left ischium and right pubis/ileum with associated pathologic fracture, chronic      2.  No acute fracture identified      CT-CSPINE WITHOUT PLUS RECONS  "  Final Result      1.  Acute pathologic type II dens fracture with mild leftward displacement of the dens fragment.   2.  Permeative lytic lesions at the C6 and C7 levels are again noted with pathologic fractures as described with likely new or increased height loss from the prior MRI.      These findings were discussed with THIAGO HERNÁNDEZ on 3/5/2024 9:26 AM.      CT-HEAD W/O   Final Result      No acute intracranial abnormality.                  CT-CHEST,ABDOMEN,PELVIS WITH    (Results Pending)       CULTURES:   Results       Procedure Component Value Units Date/Time    BLOOD CULTURE [[id number]] Collected: 03/05/24 2034    Order Status: Completed Specimen: Blood from Peripheral Updated: 03/06/24 0941     Significant Indicator NEG     Source BLD     Site PERIPHERAL     Culture Result No Growth  Note: Blood cultures are incubated for 5 days and  are monitored continuously.Positive blood cultures  are called to the RN and reported as soon as  they are identified.      Narrative:      Per Hospital Policy: Only change Specimen Src: to \"Line\" if  specified by physician order.  Right Hand    BLOOD CULTURE [[id number]] Collected: 03/05/24 2035    Order Status: Completed Specimen: Blood from Peripheral Updated: 03/06/24 0941     Significant Indicator NEG     Source BLD     Site PERIPHERAL     Culture Result No Growth  Note: Blood cultures are incubated for 5 days and  are monitored continuously.Positive blood cultures  are called to the RN and reported as soon as  they are identified.      Narrative:      Per Hospital Policy: Only change Specimen Src: to \"Line\" if  specified by physician order.  Left Forearm/Arm    URINALYSIS [[id number]]  (Abnormal) Collected: 03/06/24 0030    Order Status: Completed Specimen: Urine, Clean Catch Updated: 03/06/24 0105     Color DK Yellow     Character Clear     Specific Gravity 1.029     Ph 6.5     Glucose Negative mg/dL      Ketones 40 mg/dL      Protein 30 mg/dL      Bilirubin Moderate "     Urobilinogen, Urine 4.0     Nitrite Negative     Leukocyte Esterase Small     Occult Blood Negative     Micro Urine Req Microscopic    Narrative:      Collected By: [id number] HOSSEIN BRADSHAW  Indication for culture:->New onset fever with no other  identified cause  Indication for culture:->Acute unexplained altered mental  status ONLY after ruling out other recognized cause    URINE CULTURE(NEW) [[id number]] Collected: 03/06/24 0030    Order Status: Sent Specimen: Urine, Clean Catch Updated: 03/06/24 0050    Narrative:      Collected By: [id number] HOSSEIN BRADSHAW  Indication for culture:->New onset fever with no other  identified cause  Indication for culture:->Acute unexplained altered mental  status ONLY after ruling out other recognized cause            MEDS:  Current Facility-Administered Medications   Medication Last Admin    lactated ringers infusion New Bag at 03/07/24 0856    potassium chloride (Kcl) ivpb 10 mEq 10 mEq at 03/07/24 0857    morphine ER (Ms Contin) tablet 45 mg 45 mg at 03/07/24 0534    budesonide (Pulmicort) neb susp 0.25 mg      hydrOXYzine HCl (Atarax) tablet 25 mg      acetaminophen (Tylenol) tablet 650 mg 650 mg at 03/05/24 1828    senna-docusate (Pericolace Or Senokot S) 8.6-50 MG per tablet 2 Tablet 2 Tablet at 03/06/24 1733    And    polyethylene glycol/lytes (Miralax) Packet 1 Packet      labetalol (Normodyne/Trandate) injection 10 mg      ondansetron (Zofran) syringe/vial injection 4 mg 4 mg at 03/07/24 0853    ondansetron (Zofran ODT) dispertab 4 mg      promethazine (Phenergan) tablet 12.5-25 mg      promethazine (Phenergan) suppository 12.5-25 mg      prochlorperazine (Compazine) injection 5-10 mg 10 mg at 03/06/24 1510    metaxalone (Skelaxin) tablet 800 mg 800 mg at 03/07/24 0534       ASSESSMENT/PLAN:  61 y.o. male with history of metastatic melanoma with metastasis to the bone and lymph nodes admitted for GLF with pathologic fractures of C2, C6, and C7.     *  Fracture dislocation of cervical spine, initial encounter (HCC)- (present on admission)  Assessment & Plan  Patient presented to the ED after GLF at home while transferring from bed into a wheelchair. CT of the C-spine indicated acute pathologic type II dens fracture with mild leftward displacement of the dens fragment as well as permeative lytic lesions of C6 and C7 with pathological fractures.  Head CT was negative for intracranial abnormalities. Ortho spine has seen the patient and spoken with the family and does not advise surgery at this time given metastasis to the fractured bone.  Plan  -Continue cervical collar indefinitely  -Continue home medication of MS Contin.   -Nursing staff notified of coccyx pain and will assess the area and apply necessary padding to prevent skin breakdown  - Oncology team following. Pending CT imaging to evaluate the extent of metastatic cancer. Results likely will not change course of action.    Delirium  Assessment & Plan  Patient with waxing and waning delirium. Wife feels this is new for patient. CT head was unremarkable. Neurologic exam was performed yet limited due to C collar- CN II-X and XII intact. Neurologic exam has remained non-focal since admission. Low suspicion for infection as source of delirium. Likely metabolic in nature due to malnutrition, but could most definitely be related to chronic opioids or hospital environment.  Plan:   - 3/6 CAM-ICU positive for delirium   - Continue to reorient patient as needed   - 3/6 Family agreeable to reducing dose to 45 mg BID to see if this helps with delirium. Will increase back to 60 mg BID as patient has remained confused.    - Continue patient on  mL/hr     Ground-level fall  Assessment & Plan  Patient presents today after ground-level fall.  Likely due to deconditioned state maybe a component of high opioid use for cancer related pain. So far imaging performed in the ER includes a negative CT head.  CT C-spine with  pathological fracture of C2 as well as fractures of C6 and 7. Hip Xray indicated chronic bone metastasis no acute fracture  Plan  -Considered PT and OT evaluate patient during this hospitalization.  Patient declines PT or OT evaluation at this time. Just wants to be comfortable. Family agreeable to reducing dose to 45 mg BID to see if this helps with delirium. Will increase back up to 60 mg if not controlling pain.       Cancer related pain- (present on admission)  Assessment & Plan  Patient with known history of metastatic melanoma to the bone.  Patient being followed by Dr. Ramírez - palliative with his oncologist.  Patient is currently on MS Contin 60 mg every 12 hours.  Plan  -Will continue patient's home medication at this time.  Patient is not wanting to consider other pain meds however if his pain persist due to new pathologic fractures of the cervical spine can expand pain regimen.  Patient will be kept on telemetry and continuous pulse ox given cervical spine fracture and heavy morphine dose.  - 3/6 Family agreeable to reduction of MS contin to 45 mg BID to see if this helps with delirium. Will increase back up to 60 mg if not controlling pain.     Fever- (present on admission)  Assessment & Plan  Fever of 101.2 on 3/5. No further fevers. WBC 3.9. UA did not show infection. Urine cultures, blood cultures pending. Low suspicion for infection or neutropenic fever. Will continue to monitor following recommendations of oncology.     Constipation- (present on admission)  Assessment & Plan  Patient currently on high-dose opioids.  He does have minimal intake per wife.  He currently only has a largely liquid diet.  Will however continue patient on bowel regimen unless patient complaining of diarrhea to avoid patient getting constipation or ileus while in the hospital.    Metastatic melanoma, BRAF V600E POSITIVE  (HCC)- (present on admission)  Assessment & Plan  Patient with known history of metastatic melanoma.   Patient is currently on immunotherapy outpatient.  Plan  -Discussed case with Dr. Bedolla who is patient's palliative care physician with his oncology team   -Dr. Ross with oncology advised can continue to hold immunotherapy while inpatient.     Moderate persistent asthma without complication- (present on admission)  Assessment & Plan  Continue home Pulmicort daily         Core Measures:  Fluids: LR @ 100 mL/hr  Lines: Peripheral IV for intravenous access  Abx: None  Diet:  easy to chew, advance as tolerated  PPX: Holding lovenox     CODE Status: DNAR/DNI      Disposition  Patient will remain inpatient for continued monitoring of vitals as he did meet SIRS criteria as evidenced by tachycardia and leukopenia. Will monitor labs and continue IV fluids. Awaiting CT and oncology discussion for further plan.    I have personally seen and examined the patient at bedside. I discussed the plan of care with patient, family, and  Nam Gill M.d..      Femi Jensen, Student   MS3  [facility]      Statement Selected
